# Patient Record
Sex: FEMALE | Race: WHITE | NOT HISPANIC OR LATINO | Employment: OTHER | URBAN - METROPOLITAN AREA
[De-identification: names, ages, dates, MRNs, and addresses within clinical notes are randomized per-mention and may not be internally consistent; named-entity substitution may affect disease eponyms.]

---

## 2019-01-28 ENCOUNTER — HISTORICAL (OUTPATIENT)
Dept: RADIOLOGY | Facility: HOSPITAL | Age: 69
End: 2019-01-28

## 2019-02-01 ENCOUNTER — HISTORICAL (OUTPATIENT)
Dept: RADIOLOGY | Facility: HOSPITAL | Age: 69
End: 2019-02-01

## 2019-05-23 ENCOUNTER — OFFICE VISIT (OUTPATIENT)
Dept: RHEUMATOLOGY | Facility: CLINIC | Age: 69
End: 2019-05-23
Payer: MEDICARE

## 2019-05-23 VITALS
SYSTOLIC BLOOD PRESSURE: 149 MMHG | BODY MASS INDEX: 24.96 KG/M2 | WEIGHT: 146.19 LBS | DIASTOLIC BLOOD PRESSURE: 86 MMHG | HEIGHT: 64 IN | HEART RATE: 75 BPM

## 2019-05-23 DIAGNOSIS — M79.7 FIBROMYALGIA: Primary | ICD-10-CM

## 2019-05-23 PROCEDURE — 99203 OFFICE O/P NEW LOW 30 MIN: CPT | Mod: PBBFAC,PN | Performed by: STUDENT IN AN ORGANIZED HEALTH CARE EDUCATION/TRAINING PROGRAM

## 2019-05-23 PROCEDURE — 99203 OFFICE O/P NEW LOW 30 MIN: CPT | Mod: S$PBB,ICN,CMP, | Performed by: STUDENT IN AN ORGANIZED HEALTH CARE EDUCATION/TRAINING PROGRAM

## 2019-05-23 PROCEDURE — 99999 PR PBB SHADOW E&M-NEW PATIENT-LVL III: ICD-10-PCS | Mod: PBBFAC,,, | Performed by: STUDENT IN AN ORGANIZED HEALTH CARE EDUCATION/TRAINING PROGRAM

## 2019-05-23 PROCEDURE — 99203 PR OFFICE/OUTPT VISIT, NEW, LEVL III, 30-44 MIN: ICD-10-PCS | Mod: S$PBB,ICN,CMP, | Performed by: STUDENT IN AN ORGANIZED HEALTH CARE EDUCATION/TRAINING PROGRAM

## 2019-05-23 PROCEDURE — 99999 PR PBB SHADOW E&M-NEW PATIENT-LVL III: CPT | Mod: PBBFAC,,, | Performed by: STUDENT IN AN ORGANIZED HEALTH CARE EDUCATION/TRAINING PROGRAM

## 2019-05-23 RX ORDER — OXYCODONE HYDROCHLORIDE 10 MG/1
10 TABLET ORAL EVERY 8 HOURS PRN
Refills: 0 | COMMUNITY
Start: 2019-05-16

## 2019-05-23 RX ORDER — GABAPENTIN 300 MG/1
600 CAPSULE ORAL NIGHTLY
Qty: 60 CAPSULE | Refills: 5 | Status: SHIPPED | OUTPATIENT
Start: 2019-05-23 | End: 2020-05-22

## 2019-05-23 RX ORDER — DULOXETIN HYDROCHLORIDE 60 MG/1
60 CAPSULE, DELAYED RELEASE ORAL DAILY
COMMUNITY
Start: 2019-05-22

## 2019-05-23 RX ORDER — LORAZEPAM 2 MG/1
2 TABLET ORAL 3 TIMES DAILY
Refills: 3 | COMMUNITY
Start: 2019-05-09

## 2019-05-23 RX ORDER — LISINOPRIL 20 MG/1
20 TABLET ORAL DAILY
Refills: 6 | COMMUNITY
Start: 2019-03-25

## 2019-05-23 RX ORDER — LEVOTHYROXINE SODIUM 100 UG/1
100 TABLET ORAL DAILY
COMMUNITY
Start: 2019-05-22

## 2019-05-23 RX ORDER — WARFARIN SODIUM 5 MG/1
5 TABLET ORAL DAILY
Refills: 5 | COMMUNITY
Start: 2019-04-22

## 2019-05-23 RX ORDER — TIZANIDINE 4 MG/1
4 TABLET ORAL EVERY 8 HOURS PRN
Refills: 2 | COMMUNITY
Start: 2019-05-09 | End: 2023-06-10 | Stop reason: SDUPTHER

## 2019-05-24 NOTE — PROGRESS NOTES
RHEUMATOLOGY OUTPATIENT CLINIC NOTE        Attending Rheumatologist: Magda Sinclair  Primary Care Provider: Primary Doctor No   Physician Requesting Consultation: Aaareferral Self  No address on file  Chief Complaint/Reason For Consultation:  Fibromyalgia      Subjective:       HPI  Mary Aguayo is a 69 y.o. White female presents for evaluation of fibromyalgia.  Space reports having diffuse generalized body pain and achiness.  Not sleeping well.  Weeks of feeling unrefreshed.  Reports feeling very fatigued after little activity, like if she overdoes it she  is pain for it the next day.  Is currently on Cymbalta 60 mg daily.  Denies any previous relief with amitriptyline or gabapentin.  Does not remember dosages used.  Walks about 30 min a day for exercise.  Denies any significant change in mood or stress.  No history of photosensitivity, Raynaud's, pleurisy, oral ulcers, hair loss, swollen joints, miscarriages.    14pt ros negative except as otherwise stated above      Review of Systems   Constitutional: Positive for malaise/fatigue. Negative for fever and weight loss.   HENT: Positive for hearing loss. Negative for congestion and sinus pain.    Eyes: Negative for blurred vision and photophobia.   Respiratory: Negative for cough and sputum production.    Cardiovascular: Negative for chest pain and orthopnea.   Gastrointestinal: Negative for abdominal pain, heartburn and vomiting.   Genitourinary: Negative for dysuria and frequency.   Musculoskeletal: Positive for back pain, joint pain, myalgias and neck pain.   Skin: Negative for itching and rash.   Neurological: Negative for dizziness, focal weakness and headaches.   Endo/Heme/Allergies: Negative for polydipsia. Does not bruise/bleed easily.       Chronic comorbid conditions affecting medical decision making today:  No past medical history on file.  No past surgical history on file.  No family history on file.  Social History     Substance and Sexual  Activity   Alcohol Use Not on file     Social History     Tobacco Use   Smoking Status Not on file     Social History     Substance and Sexual Activity   Drug Use Not on file       Current Outpatient Medications:     DULoxetine (CYMBALTA) 60 MG capsule, Take 60 mg by mouth once daily., Disp: , Rfl:     levothyroxine (SYNTHROID) 100 MCG tablet, Take 100 mcg by mouth once daily., Disp: , Rfl:     lisinopril (PRINIVIL,ZESTRIL) 20 MG tablet, Take 20 mg by mouth once daily., Disp: , Rfl: 6    LORazepam (ATIVAN) 2 MG Tab, Take 2 mg by mouth 3 (three) times daily., Disp: , Rfl: 3    oxyCODONE (ROXICODONE) 10 mg Tab immediate release tablet, Take 10 mg by mouth every 8 (eight) hours as needed., Disp: , Rfl: 0    tiZANidine (ZANAFLEX) 4 MG tablet, Take 4 mg by mouth every 8 (eight) hours as needed., Disp: , Rfl: 2    warfarin (COUMADIN) 5 MG tablet, Take 5 mg by mouth Daily., Disp: , Rfl: 5    gabapentin (NEURONTIN) 300 MG capsule, Take 2 capsules (600 mg total) by mouth every evening., Disp: 60 capsule, Rfl: 5       Objective:         Vitals:    05/23/19 1051   BP: (!) 149/86   Pulse: 75     Physical Exam   Physical Exam   Nursing note and vitals reviewed.  Constitutional: She is oriented to person, place, and time and well-developed, well-nourished, and in no distress. No distress.   HENT:   Head: Normocephalic and atraumatic.   Eyes: Conjunctivae and EOM are normal. Pupils are equal, round, and reactive to light.   Neck: Normal range of motion. Neck supple.   Cardiovascular: Normal rate, regular rhythm and normal heart sounds.    Pulmonary/Chest: Effort normal and breath sounds normal. No respiratory distress.   Abdominal: Soft. Bowel sounds are normal. She exhibits no distension.   Neurological: She is alert and oriented to person, place, and time.   Skin: Skin is warm and dry. No rash noted.     Psychiatric: Affect and judgment normal.   Musculoskeletal: Normal range of motion. She exhibits no edema or  deformity.   No synovitis in small joints of hands and feet. No effusions over large joints.          Reviewed old and all outside pertinent medical records available.    All lab results personally reviewed and interpreted by me.  No results found for: WBC, HGB, HCT, MCV, MCH, MCHC, RDW, PLT, MPV, NEUTROABS, LYMPHOABS, MONOABS, EOSINOABS, BASOSABS, NEUTROPCT, LYMPHOPCT, MONOPCT, EOSINOPCT, BASOPCT, DIFFTYPE, RBCMORPHOLOG, PLTEST    No results found for: NA, K, CL, CO2, GLU, BUN, LABCREA, CALCIUM, PROT, ALBUMIN, BILITOT, AST, ALKPHOS, ALT, GFRAA, GFRNONAA    No results found for: COLORU, APPEARANCEUA, SPECGRAV, PHUR, PROTEINUA, GLUCOSEU, KETONESU, BLOODU, LEUKOCYTESUR, NITRITE, UROBILINOGEN    No results found for: CRP    No results found for: SEDRATE, ERYTHROCYTES    No results found for: ASHLY, RF, SEDRATE    No components found for: 25OHVITDTOT, 49QXLLBB9, 67XVAJPT2, METHODNOTE    No results found for: URICACID    No components found for: TSPOTTB         ASSESSMENT / PLAN:     Mary Aguayo is a 69 y.o. White female with:    #Fibromyalgia  -+Generalized achiness and pain, fatigue  -Previous hx of gabapentin and amitriptyline, stopped 2.2 lack of efficacy but pt states >20 years ago, dosage unknown.   -Discussed importance of proper sleep hygiene and exercise, recommend low impact aerobics.   -Can try gabapentin 300mg qhs. Increase to 600mg qhs if tolerated  -Can continue to follow w pcp for any further medicaton changes       Follow up if symptoms worsen or fail to improve.    Method of contact with patient concerns: Nichellehart attn Rheumatology      Magda Sinclair M.D.  Rheumatology Department   [facility] - [address] [address]  Phone: [phone]  Fax: [phone]

## 2019-06-18 ENCOUNTER — TELEPHONE (OUTPATIENT)
Dept: RHEUMATOLOGY | Facility: CLINIC | Age: 69
End: 2019-06-18

## 2019-06-18 NOTE — TELEPHONE ENCOUNTER
----- Message from Willa Edwards sent at 6/18/2019 10:11 AM CDT -----  Contact: pt   Pt needs findings report sent to Dr.Stephen Pina  Office  [phone] fax: [phone] .. pts call back:[phone] (home)

## 2019-08-05 ENCOUNTER — HISTORICAL (OUTPATIENT)
Dept: RADIOLOGY | Facility: HOSPITAL | Age: 69
End: 2019-08-05

## 2021-02-04 ENCOUNTER — HISTORICAL (OUTPATIENT)
Dept: RADIOLOGY | Facility: HOSPITAL | Age: 71
End: 2021-02-04

## 2021-02-10 ENCOUNTER — HISTORICAL (OUTPATIENT)
Dept: RADIOLOGY | Facility: HOSPITAL | Age: 71
End: 2021-02-10

## 2022-08-29 ENCOUNTER — HOSPITAL ENCOUNTER (OUTPATIENT)
Dept: RADIOLOGY | Facility: HOSPITAL | Age: 72
Discharge: HOME | End: 2022-08-29
Attending: FAMILY MEDICINE
Payer: MEDICARE

## 2022-08-29 DIAGNOSIS — M79.89 OTHER SPECIFIED SOFT TISSUE DISORDERS: ICD-10-CM

## 2022-08-29 DIAGNOSIS — I82.409 DVT (DEEP VENOUS THROMBOSIS): ICD-10-CM

## 2022-08-29 PROCEDURE — 93971 EXTREMITY STUDY: CPT | Mod: TC,LT

## 2022-09-22 DIAGNOSIS — M79.89 SWELLING OF LIMB: Primary | ICD-10-CM

## 2022-09-23 ENCOUNTER — HOSPITAL ENCOUNTER (OUTPATIENT)
Dept: RADIOLOGY | Facility: HOSPITAL | Age: 72
Discharge: HOME | End: 2022-09-23
Attending: FAMILY MEDICINE
Payer: MEDICARE

## 2022-09-23 DIAGNOSIS — M79.89 SWELLING OF LIMB: ICD-10-CM

## 2022-09-23 PROCEDURE — 74176 CT ABD & PELVIS W/O CONTRAST: CPT | Mod: TC

## 2023-01-06 ENCOUNTER — HOSPITAL ENCOUNTER (OUTPATIENT)
Dept: RADIOLOGY | Facility: HOSPITAL | Age: 73
Discharge: HOME | End: 2023-01-06
Attending: FAMILY MEDICINE
Payer: MEDICARE

## 2023-01-06 DIAGNOSIS — Z78.0 POSTMENOPAUSAL STATE: ICD-10-CM

## 2023-01-06 DIAGNOSIS — Z12.31 ENCOUNTER FOR SCREENING MAMMOGRAM FOR BREAST CANCER: ICD-10-CM

## 2023-01-06 PROCEDURE — 77067 SCR MAMMO BI INCL CAD: CPT | Mod: 26,,, | Performed by: STUDENT IN AN ORGANIZED HEALTH CARE EDUCATION/TRAINING PROGRAM

## 2023-01-06 PROCEDURE — 77063 MAMMO DIGITAL SCREENING BILAT WITH TOMO: ICD-10-PCS | Mod: 26,,, | Performed by: STUDENT IN AN ORGANIZED HEALTH CARE EDUCATION/TRAINING PROGRAM

## 2023-01-06 PROCEDURE — 77067 MAMMO DIGITAL SCREENING BILAT WITH TOMO: ICD-10-PCS | Mod: 26,,, | Performed by: STUDENT IN AN ORGANIZED HEALTH CARE EDUCATION/TRAINING PROGRAM

## 2023-01-06 PROCEDURE — 77067 SCR MAMMO BI INCL CAD: CPT | Mod: TC

## 2023-01-06 PROCEDURE — 77080 DXA BONE DENSITY AXIAL: CPT | Mod: TC

## 2023-01-06 PROCEDURE — 77063 BREAST TOMOSYNTHESIS BI: CPT | Mod: 26,,, | Performed by: STUDENT IN AN ORGANIZED HEALTH CARE EDUCATION/TRAINING PROGRAM

## 2023-06-10 ENCOUNTER — HOSPITAL ENCOUNTER (EMERGENCY)
Facility: HOSPITAL | Age: 73
Discharge: HOME | End: 2023-06-10
Attending: INTERNAL MEDICINE
Payer: MEDICARE

## 2023-06-10 VITALS
WEIGHT: 199 LBS | HEART RATE: 86 BPM | TEMPERATURE: 97 F | BODY MASS INDEX: 33.97 KG/M2 | RESPIRATION RATE: 17 BRPM | OXYGEN SATURATION: 94 % | SYSTOLIC BLOOD PRESSURE: 132 MMHG | DIASTOLIC BLOOD PRESSURE: 71 MMHG | HEIGHT: 64 IN

## 2023-06-10 DIAGNOSIS — M54.31 SCIATICA OF RIGHT SIDE: Primary | ICD-10-CM

## 2023-06-10 LAB
INR BLD: 1.21 (ref 0–1.3)
PROTHROMBIN TIME: 15.6 SECONDS (ref 12.5–14.5)

## 2023-06-10 PROCEDURE — 96372 THER/PROPH/DIAG INJ SC/IM: CPT | Performed by: INTERNAL MEDICINE

## 2023-06-10 PROCEDURE — 85610 PROTHROMBIN TIME: CPT | Performed by: INTERNAL MEDICINE

## 2023-06-10 PROCEDURE — 99285 EMERGENCY DEPT VISIT HI MDM: CPT | Mod: 25

## 2023-06-10 PROCEDURE — 63600175 PHARM REV CODE 636 W HCPCS: Performed by: INTERNAL MEDICINE

## 2023-06-10 RX ORDER — DEXAMETHASONE SODIUM PHOSPHATE 4 MG/ML
8 INJECTION, SOLUTION INTRA-ARTICULAR; INTRALESIONAL; INTRAMUSCULAR; INTRAVENOUS; SOFT TISSUE
Status: COMPLETED | OUTPATIENT
Start: 2023-06-10 | End: 2023-06-10

## 2023-06-10 RX ORDER — KETOROLAC TROMETHAMINE 30 MG/ML
30 INJECTION, SOLUTION INTRAMUSCULAR; INTRAVENOUS
Status: COMPLETED | OUTPATIENT
Start: 2023-06-10 | End: 2023-06-10

## 2023-06-10 RX ORDER — TIZANIDINE 4 MG/1
4 TABLET ORAL EVERY 8 HOURS PRN
Qty: 30 TABLET | Refills: 0 | Status: SHIPPED | OUTPATIENT
Start: 2023-06-10

## 2023-06-10 RX ORDER — METHYLPREDNISOLONE 4 MG/1
TABLET ORAL
Qty: 21 EACH | Refills: 0 | Status: SHIPPED | OUTPATIENT
Start: 2023-06-10 | End: 2023-07-01

## 2023-06-10 RX ADMIN — DEXAMETHASONE SODIUM PHOSPHATE 8 MG: 4 INJECTION, SOLUTION INTRA-ARTICULAR; INTRALESIONAL; INTRAMUSCULAR; INTRAVENOUS; SOFT TISSUE at 09:06

## 2023-06-10 RX ADMIN — KETOROLAC TROMETHAMINE 30 MG: 30 INJECTION, SOLUTION INTRAMUSCULAR; INTRAVENOUS at 09:06

## 2023-06-10 NOTE — DISCHARGE INSTRUCTIONS
Talk to your doctor about adjusting the dose of your Coumadin as your INR is 1.2.    Talk to your doctor about getting possibly an MRI of the back to look at the disc more appropriately CT scan done today does not show any major abnormality        Take medicines as prescribed    See your family doctor in one to 2 days for further evaluation, workup, and treatment as necessary    Avoid driving or operating machinery while taking medicines as some medicines might cause drowsiness and may cause problems. Also pain medicines have potential of being addictive  so use Pain meds specially Narcotics Sparingly.    The exam and treatment you received in Emergency Room was for an urgent problem and NOT INTENDED AS COMPLETE CARE. It is important that you FOLLOW UP with a doctor for ongoing care. If your symptoms become WORSE or you DO NOT IMPROVE and you are unable to reach your health care provider, you should RETURN to the emergency department. The Emergency Room doctor has provided a PRELIMINARY INTERPRETATION of all your STUDIES. A final interpretation may be done after you are discharged. IF A CHANGE in your diagnosis or treatment is needed WE WILL CONTACT YOU. It is critical that we have a CURRENT PHONE NUMBER FOR YOU.

## 2023-06-10 NOTE — ED PROVIDER NOTES
06/10/2023         9:24 AM    Source of History:  History obtained from the patient.     Chief complaint:  From Nurse Triage:  Leg Pain (Pt c/o rt leg pain starting at rt groin and extending down rt leg, no swelling, denies injury. Pt reports hx of clotting disorder and is on coumadine)    HISTORY OF PRESENT ILLNES:  Mary Aguayo is a 73 y.o. female  has a past medical history of Arthritis, DVT (deep venous thrombosis), Factor V Leiden mutation, Fibromyalgia, and Hypertension. presenting with Leg Pain (Pt c/o rt leg pain starting at rt groin and extending down rt leg, no swelling, denies injury. Pt reports hx of clotting disorder and is on coumadine)      REVIEW OF SYSTEMS:   Constitutional symptoms:  No Fever. No Chills    Skin symptoms:  No Rash.    Eye symptoms:  No Visual disturbance reported.   ENMT symptoms:  No Sore throat,    Respiratory symptoms:  No Shortness of Breath, no Cough, no Wheezing.    Cardiovascular symptoms:  No Chest Pain, No Palpitations.   Gastrointestinal symptoms:  No Abdominal Pain, No Nausea, No Vomiting, No Diarrhea, No Constipation.    Genitourinary symptoms:  No Dysuria,    Musculoskeletal symptoms:   Back pain,  goes down the right leg anteriorly  Neurologic symptoms:  No Headache, No Dizziness.    Psychiatric symptoms:  No Anxiety, No Depression, No Substance Abuse.              Additional review of systems information: Patient Denies Any Other Complaints.    All Other Systems Reviewed With Patient And Negative.    ALLEGIES:  Review of patient's allergies indicates:   Allergen Reactions    Pcn [penicillins] Hives       MEDICINE LIST:  Current Outpatient Medications   Medication Instructions    DULoxetine (CYMBALTA) 60 mg, Oral, Daily    gabapentin (NEURONTIN) 600 mg, Oral, Nightly    levothyroxine (SYNTHROID) 100 mcg, Oral, Daily    lisinopriL (PRINIVIL,ZESTRIL) 20 mg, Oral, Daily    LORazepam (ATIVAN) 2 mg, Oral, 3 times daily    methylPREDNISolone (MEDROL DOSEPACK) 4 mg  tablet use as directed    oxyCODONE (ROXICODONE) 10 mg, Oral, Every 8 hours PRN    tiZANidine (ZANAFLEX) 4 mg, Oral, Every 8 hours PRN    warfarin (COUMADIN) 5 mg, Oral, Daily        PMH:  As per HPI and below:    Reviewed and updated in chart.    PAST MEDICAL HISTORY:  Past Medical History:   Diagnosis Date    Arthritis     DVT (deep venous thrombosis)     Factor V Leiden mutation     Fibromyalgia     Hypertension         PAST SURGICAL HISTORY:  Past Surgical History:   Procedure Laterality Date    BILATERAL TUBAL LIGATION Bilateral     CHOLECYSTECTOMY         SOCIAL HISTORY:  Social History     Tobacco Use    Smoking status: Never    Smokeless tobacco: Never   Substance Use Topics    Alcohol use: Never    Drug use: Never       FAMILY HISTORY:  Family History   Problem Relation Age of Onset    Lymphoma Mother     Dementia Father         PROBLEM LIST:  There is no problem list on file for this patient.       PHYSICAL EXAM:      ED Triage Vitals [06/10/23 0903]   BP (!) 148/86   Pulse 99   Resp 18   Temp 97 °F (36.1 °C)   SpO2 (!) 92 %        Vital Signs: Reviewed As In Chart.  General:  Alert, No Cardiorespiratory Distress Noted.   Eye:  Extraocular Movements Are Intact.   ENT: Mucus membranes are moist.   Cardiovascular:  Regular Rate And Rhythm, No Murmur, No Pedal Edema.  2+ bilateral posterior tibial and dorsalis pedis pulses  Respiratory:  Respirations Nonlabored, No Respiratory Distress, Good Bilateral Air Entry, No Rales, No Rhonchi.    Gastrointestinal:  Soft, Non Distended, Non Tenderness, Normal Bowel Sounds.    Neurological:  Alert And Oriented To Person, Place, Time, And Situation, Normal Motor Observed, Normal Speech Observed.  Musculoskeletal:  No Gross Deformity Noted.  No tenderness in the calf, no swelling in the lower extremity, normal capillary refill, no swelling on either extremities    Psychiatric:  Cooperative.      ED WORKUP FOR MEDICAL DECISION MAKING:    ED ORDERS:  Orders Placed This  Encounter   Procedures    CT Lumbar Spine Without Contrast    Protime-INR       ED MEDICINES:  Medications   dexAMETHasone injection 8 mg (8 mg Intramuscular Given 6/10/23 0937)   ketorolac injection 30 mg (30 mg Intramuscular Given 6/10/23 0937)                ED LABS ORDERED AND REVIEWED:  Admission on 06/10/2023   Component Date Value Ref Range Status    PT 06/10/2023 15.6 (H)  12.5 - 14.5 seconds Final    INR 06/10/2023 1.21  0.00 - 1.30 Final       RADIOLOGY STUDIES ORDERED AND REVIEWED:  Imaging Results              CT Lumbar Spine Without Contrast (Final result)  Result time 06/10/23 10:32:14      Final result by Tanner Forbes MD (06/10/23 10:32:14)                   Impression:      No evidence for fracture.      Electronically signed by: Tanner Forbes MD  Date:    06/10/2023  Time:    10:32               Narrative:    EXAMINATION:  CT LUMBAR SPINE WITHOUT CONTRAST    CLINICAL HISTORY:  Low back pain, symptoms persist with > 6wks conservative treatment;    TECHNIQUE:  Low-dose axial, sagittal and coronal reformations are obtained through the lumbar spine.  Contrast was not administered.  An automated dose exposure technique was utilized this limits radiation does the patient.    COMPARISON:  None.    FINDINGS:  Five lumbar type vertebral bodies.  The alignment curvature lumbar spine is normal.  The vertebral heights and intervertebral disc spaces maintained.  No evidence for compression deformity, fracture, or subluxation.  No evidence for central canal stenosis or neural foraminal narrowing.  Scattered spondylotic changes are identified.    The visualized hollow and solid viscera grossly normal.  The bilateral SI joints are grossly normal.                                      MEDICAL DECISION MAKING:      Reviewed Nurses Note. Reviewed Vital Signs.     Reviewed Pertinent old records, History and updated as necessary.    Vitals:    06/10/23 1104   BP: 132/71   Pulse: 86   Resp: 17   Temp:         Medical  Decision Making  73 y.o. female  has a past medical history of Arthritis, DVT (deep venous thrombosis), Factor V Leiden mutation, Fibromyalgia, and Hypertension. presenting with Leg Pain (Pt c/o rt leg pain starting at rt groin and extending down rt leg, no swelling, denies injury. Pt reports hx of clotting disorder and is on coumadine)    Patient says that she has factor 5 Leiden and she is on Coumadin taking it regularly, she is been having lower extremity pain for years 2 months now, she says she continues to have pain in the back coming down the right leg, so today her family doctor told her to go to the emergency room because she can not take it anymore.  Patient had a CT scan done for this type of pain in the past of the abdomen which did not show any major abnormality, she also had the same symptoms in the left lower extremity and DVT was negative, she is having the same pain in the right lower extremity for months now and today she decided to come to the emergency room.    Patient says that she has the fibromyalgia, and steroids do not work for her and also she was given steroids for osteoarthritis and they did not help either.              ED Course as of 06/10/23 1105   Sat Nathaniel 10, 2023   1104 Patient's CT scan does not show any major abnormality at this time, I will put her on muscle relaxant and the Medrol Dosepak after the shot she is feeling better at this time,    I have also advised her that her INR is 1.2 and she should talk to her family doctor about adjusting the dose of her Coumadin as he knows better.  Patient verbalized understanding and she knows that her Coumadin has to be adjusted.  I will discharge her home with instruction to take Medrol Dosepak and tizanidine, she is already prescribed tizanidine, I have advised her to talk to her family doctor about possibly getting an MRI of the back if she continues to have the problem. [GQ]      ED Course User Index  [GQ] Benjie Mcdaniel MD             PROCEDURES PERFORMED IN ED:  Procedures    DIAGNOSTIC IMPRESSION:        ICD-10-CM ICD-9-CM   1. Sciatica of right side  M54.31 724.3         ED Disposition Condition    Discharge Stable               Medication List        START taking these medications      methylPREDNISolone 4 mg tablet  Commonly known as: MEDROL DOSEPACK  use as directed            CONTINUE taking these medications      tiZANidine 4 MG tablet  Commonly known as: ZANAFLEX  Take 1 tablet (4 mg total) by mouth every 8 (eight) hours as needed.            ASK your doctor about these medications      DULoxetine 60 MG capsule  Commonly known as: CYMBALTA     gabapentin 300 MG capsule  Commonly known as: NEURONTIN  Take 2 capsules (600 mg total) by mouth every evening.     levothyroxine 100 MCG tablet  Commonly known as: SYNTHROID     lisinopriL 20 MG tablet  Commonly known as: PRINIVIL,ZESTRIL     LORazepam 2 MG Tab  Commonly known as: ATIVAN     oxyCODONE 10 mg Tab immediate release tablet  Commonly known as: ROXICODONE     warfarin 5 MG tablet  Commonly known as: COUMADIN               Where to Get Your Medications        These medications were sent to [facility] [place]  [address]  [address] [address]      Phone: [phone]   methylPREDNISolone 4 mg tablet  tiZANidine 4 MG tablet           Follow-up Information       Reynold Pina MD In 2 days.    Specialty: Family Medicine  Contact information:  [address]  [address]  [place] [address] [phone]                              ED Prescriptions       Medication Sig Dispense Start Date End Date Auth. Provider    methylPREDNISolone (MEDROL DOSEPACK) 4 mg tablet use as directed 21 each 6/10/2023 7/1/2023 Benjie Mcdaniel MD    tiZANidine (ZANAFLEX) 4 MG tablet Take 1 tablet (4 mg total) by mouth every 8 (eight) hours as needed. 30 tablet 6/10/2023 -- Benjie Mcdaniel MD          Follow-up Information       Follow up With Specialties Details Why  Contact Info    Reynold Pina MD Family Medicine In 2 days  [address]  [address]  [place] [address]  [phone]                 Benjie Mcdaniel MD  06/10/23 8921

## 2023-06-20 DIAGNOSIS — M54.31 RIGHT SIDED SCIATICA: Primary | ICD-10-CM

## 2023-06-23 ENCOUNTER — HOSPITAL ENCOUNTER (OUTPATIENT)
Dept: RADIOLOGY | Facility: HOSPITAL | Age: 73
Discharge: HOME | End: 2023-06-23
Attending: FAMILY MEDICINE
Payer: MEDICARE

## 2023-06-23 DIAGNOSIS — M54.31 RIGHT SIDED SCIATICA: ICD-10-CM

## 2023-06-23 PROCEDURE — 72148 MRI LUMBAR SPINE W/O DYE: CPT | Mod: TC

## 2023-07-23 ENCOUNTER — HOSPITAL ENCOUNTER (EMERGENCY)
Facility: HOSPITAL | Age: 73
Discharge: HOME | End: 2023-07-23
Attending: EMERGENCY MEDICINE
Payer: MEDICARE

## 2023-07-23 VITALS
DIASTOLIC BLOOD PRESSURE: 98 MMHG | HEART RATE: 95 BPM | SYSTOLIC BLOOD PRESSURE: 196 MMHG | BODY MASS INDEX: 30.73 KG/M2 | OXYGEN SATURATION: 92 % | TEMPERATURE: 98 F | RESPIRATION RATE: 16 BRPM | WEIGHT: 180 LBS | HEIGHT: 64 IN

## 2023-07-23 DIAGNOSIS — S00.03XA HEMATOMA OF SCALP, INITIAL ENCOUNTER: ICD-10-CM

## 2023-07-23 DIAGNOSIS — S22.080A CLOSED WEDGE COMPRESSION FRACTURE OF T12 VERTEBRA, INITIAL ENCOUNTER: Primary | ICD-10-CM

## 2023-07-23 PROCEDURE — 96372 THER/PROPH/DIAG INJ SC/IM: CPT | Performed by: NURSE PRACTITIONER

## 2023-07-23 PROCEDURE — 25000003 PHARM REV CODE 250: Performed by: NURSE PRACTITIONER

## 2023-07-23 PROCEDURE — 63600175 PHARM REV CODE 636 W HCPCS: Performed by: NURSE PRACTITIONER

## 2023-07-23 PROCEDURE — 99285 EMERGENCY DEPT VISIT HI MDM: CPT | Mod: 25

## 2023-07-23 RX ORDER — HYDROCODONE BITARTRATE AND ACETAMINOPHEN 10; 325 MG/1; MG/1
1 TABLET ORAL EVERY 6 HOURS PRN
Qty: 12 TABLET | Refills: 0 | Status: SHIPPED | OUTPATIENT
Start: 2023-07-23 | End: 2023-07-28

## 2023-07-23 RX ORDER — HYDROMORPHONE HYDROCHLORIDE 2 MG/ML
0.5 INJECTION, SOLUTION INTRAMUSCULAR; INTRAVENOUS; SUBCUTANEOUS
Status: COMPLETED | OUTPATIENT
Start: 2023-07-23 | End: 2023-07-23

## 2023-07-23 RX ORDER — HYDROCODONE BITARTRATE AND ACETAMINOPHEN 10; 325 MG/1; MG/1
1 TABLET ORAL
Status: COMPLETED | OUTPATIENT
Start: 2023-07-23 | End: 2023-07-23

## 2023-07-23 RX ADMIN — HYDROMORPHONE HYDROCHLORIDE 0.5 MG: 2 INJECTION, SOLUTION INTRAMUSCULAR; INTRAVENOUS; SUBCUTANEOUS at 05:07

## 2023-07-23 RX ADMIN — HYDROCODONE BITARTRATE AND ACETAMINOPHEN 1 TABLET: 10; 325 TABLET ORAL at 04:07

## 2023-07-23 NOTE — ED PROVIDER NOTES
Encounter Date: 7/23/2023       History     Chief Complaint   Patient presents with    Fall     Fall down 2 steps pta. -loc. -bt. Hematoma to back of head. Complains of pain to midline lower back and left elbow as well     Patient is a 73-year-old female who presents emerged department with complaints of head and lower back pain.  She states she was walking down some steps and lost her balance landing on her back in the back of her head.  There is a hematoma noted to the posterior scalp on the left side.  She denies losing consciousness.  She denies any other complaints or associated symptoms.  She does have history of back issues and recently had an MRI which showed bulging disc.    Review of patient's allergies indicates:   Allergen Reactions    Pcn [penicillins] Hives     Past Medical History:   Diagnosis Date    Arthritis     DVT (deep venous thrombosis)     Factor V Leiden mutation     Fibromyalgia     Hypertension      Past Surgical History:   Procedure Laterality Date    BILATERAL TUBAL LIGATION Bilateral     CHOLECYSTECTOMY       Family History   Problem Relation Age of Onset    Lymphoma Mother     Dementia Father      Social History     Tobacco Use    Smoking status: Never    Smokeless tobacco: Never   Substance Use Topics    Alcohol use: Never    Drug use: Never     Review of Systems   Constitutional:  Negative for activity change, appetite change and fever.   HENT:  Negative for congestion, dental problem and sore throat.    Eyes:  Negative for discharge and itching.   Respiratory:  Negative for apnea, chest tightness and shortness of breath.    Cardiovascular:  Negative for chest pain.   Gastrointestinal:  Negative for abdominal distention, abdominal pain and nausea.   Endocrine: Negative for cold intolerance and heat intolerance.   Genitourinary:  Negative for dysuria, vaginal bleeding, vaginal discharge and vaginal pain.   Musculoskeletal:  Positive for back pain, gait problem and myalgias. Negative  for neck pain and neck stiffness.   Skin:  Negative for rash.   Neurological:  Positive for headaches. Negative for dizziness, facial asymmetry and weakness.   Hematological:  Does not bruise/bleed easily.   Psychiatric/Behavioral:  Negative for agitation and behavioral problems.    All other systems reviewed and are negative.    Physical Exam     Initial Vitals [07/23/23 1422]   BP Pulse Resp Temp SpO2   (!) 164/111 95 20 97.5 °F (36.4 °C) (!) 92 %      MAP       --         Physical Exam    Nursing note and vitals reviewed.  Constitutional: Vital signs are normal. She appears well-developed and well-nourished.  Non-toxic appearance. She does not have a sickly appearance.   HENT:   Head: Normocephalic and atraumatic.   Right Ear: External ear normal.   Left Ear: External ear normal.   Eyes: Conjunctivae, EOM and lids are normal. Pupils are equal, round, and reactive to light. Lids are everted and swept, no foreign bodies found.   Neck: Trachea normal and phonation normal. Neck supple. No thyroid mass and no thyromegaly present.   Normal range of motion.   Full passive range of motion without pain.     Cardiovascular:  Normal rate, regular rhythm, S1 normal, S2 normal, normal heart sounds, intact distal pulses and normal pulses.           Pulmonary/Chest: Breath sounds normal. No respiratory distress.   Abdominal: Abdomen is soft. There is no abdominal tenderness.   Musculoskeletal:         General: Tenderness present. No edema.      Cervical back: Full passive range of motion without pain, normal range of motion and neck supple.      Comments: Large hematoma to the left posterior scalp is tender in moderately inflamed.  No active bleeding or laceration noted.    Patient is also tender to midline spine around L5-L6 region.  No bruising swelling deformity noted on exam of the back but she is tender with palpation.     Lymphadenopathy:     She has no cervical adenopathy.   Neurological: She is alert and oriented to  person, place, and time. She has normal strength. GCS score is 15. GCS eye subscore is 4. GCS verbal subscore is 5. GCS motor subscore is 6.   Skin: Skin is warm, dry and intact. Capillary refill takes less than 2 seconds.   Psychiatric: She has a normal mood and affect. Her speech is normal and behavior is normal. Judgment normal. Cognition and memory are normal.       ED Course   Procedures  Labs Reviewed - No data to display       Imaging Results              CT Lumbar Spine Without Contrast (Final result)  Result time 07/23/23 16:18:26      Final result by Tanner Cruz MD (07/23/23 16:18:26)                   Impression:      Nondisplaced fracture of the superior endplate of the T12 vertebral body without involvement of the posterior aspect of the vertebral body or retropulsion of fracture fragments.      Electronically signed by: Tanner Cruz MD  Date:    07/23/2023  Time:    16:18               Narrative:    EXAMINATION:  CT LUMBAR SPINE WITHOUT CONTRAST    CLINICAL HISTORY:  Low back pain, trauma;    TECHNIQUE:  Axial images of the lumbar spine were obtained without IV contrast administration.  Coronal and sagittal reconstructions were provided.  Three dimensional and MIP images were obtained and evaluated.  Total DLP was 1051 mGy-cm. Dose lowering technique and automated exposure control were utilized for this exam.    COMPARISON:  MRI of the lumbar spine 06/23/2023.    FINDINGS:  There is a nondisplaced fracture of the T12 vertebral body not visualized on the prior exam.  Sagittal alignment is maintained.  There is no involvement of the posterior aspect of the vertebral body.  There is no spondylolisthesis.  There is vacuum disc phenomena at L3-L4 and L4-L5.  There is multilevel degenerative facet arthropathy.  There is no bony mass lesion.    The paraspinal musculature is normal.  The aorta is nonaneurysmal.                                       CT Head Without Contrast (Final result)  Result time 07/23/23  15:15:23      Final result by Tanner Cruz MD (07/23/23 15:15:23)                   Impression:      Left parietal scalp hematoma without underlying fracture or acute intracranial abnormality.      Electronically signed by: Tanner Cruz MD  Date:    07/23/2023  Time:    15:15               Narrative:    EXAMINATION:  CT HEAD WITHOUT CONTRAST    CLINICAL HISTORY:  Head trauma, minor (Age >= 65y);    TECHNIQUE:  Axial images of the head were obtained without IV contrast administration.  Coronal and sagittal reconstructions were provided.  Three dimensional and MIP images were obtained and evaluated.  Total DLP was 944 mGy-cm. Dose lowering technique and automated exposure control were utilized for this exam.    COMPARISON:  CT of the head 10/02/2016.    FINDINGS:  There is normal brain formation.  There is normal gray-white matter differentiation.  There is no hemorrhage, hydrocephalus, or midline shift.  There is no cytotoxic or vasogenic edema.  There is no intra or extra-axial fluid collection.  There is no herniation.    There is a left posterior parietal scalp hematoma.  The underlying calvarium is intact.  The bilateral orbits are normal.  There is mucoperiosteal thickening of the left maxillary sinus.                                       Medications   HYDROcodone-acetaminophen  mg per tablet 1 tablet (1 tablet Oral Given 7/23/23 1646)   HYDROmorphone (PF) injection 0.5 mg (0.5 mg Intramuscular Given 7/23/23 1712)                 ED Course as of 07/24/23 1610   Sun Jul 23, 2023   1648  orthotics contacted for TLSO brace. [SL]      ED Course User Index  [SL] ADDISON Nickerson          Medical Decision Making  Patient is a 73-year-old female who brought into the emergency room for complaints of head and back pain.  States this started after trip fall down some stairs.  She denies loss of consciousness.  She does take blood thinners daily.    Problems Addressed:  Closed wedge compression fracture of  T12 vertebra, initial encounter: acute illness or injury     Details: CT was done back which does reveal a fracture to T12.   consult for TLSO brace.  IM and p.o. medication given here which did relieve her pain.  Discussed follow-up with neurosurgery which will be sent today for her.  Discussed oral medicine to continue at home.  Discussed brace care.  Discussed strict ED return precautions for any changing worsening symptoms which she did verbalized understanding of.  Hematoma of scalp, initial encounter: acute illness or injury     Details: Discussed ice this area topically 3 times daily for swelling as needed.    Amount and/or Complexity of Data Reviewed  External Data Reviewed: labs, radiology and notes.  Radiology: ordered. Decision-making details documented in ED Course.            Clinical Impression:   Final diagnoses:  [S22.080A] Closed wedge compression fracture of T12 vertebra, initial encounter (Primary)  [S00.03XA] Hematoma of scalp, initial encounter        ED Disposition Condition    Discharge Stable          ED Prescriptions       Medication Sig Dispense Start Date End Date Auth. Provider    HYDROcodone-acetaminophen (NORCO)  mg per tablet Take 1 tablet by mouth every 6 (six) hours as needed for Pain. 12 tablet 7/23/2023 7/28/2023 ADDISON Nickerson          Follow-up Information       Follow up With Specialties Details Why Contact Info    Reynold Pina MD Family Medicine Schedule an appointment as soon as possible for a visit  As needed, For ER Follow Up. [address]  [address]  [place] [address]  [phone]               ADDISON Nickerson  07/24/23 4471

## 2023-12-24 ENCOUNTER — HOSPITAL ENCOUNTER (EMERGENCY)
Facility: HOSPITAL | Age: 73
Discharge: HOME | End: 2023-12-25
Attending: EMERGENCY MEDICINE
Payer: MEDICARE

## 2023-12-24 DIAGNOSIS — K06.8 GINGIVAL BLEEDING: Primary | ICD-10-CM

## 2023-12-24 LAB
INR PPP: 2.2
PROTHROMBIN TIME: 25.1 SECONDS (ref 12.5–14.5)

## 2023-12-24 PROCEDURE — 25000003 PHARM REV CODE 250: Performed by: EMERGENCY MEDICINE

## 2023-12-24 PROCEDURE — 85610 PROTHROMBIN TIME: CPT | Performed by: EMERGENCY MEDICINE

## 2023-12-24 PROCEDURE — 99283 EMERGENCY DEPT VISIT LOW MDM: CPT

## 2023-12-24 RX ORDER — SILVER NITRATE 38.21; 12.74 MG/1; MG/1
1 STICK TOPICAL
Status: COMPLETED | OUTPATIENT
Start: 2023-12-24 | End: 2023-12-24

## 2023-12-24 RX ADMIN — SILVER NITRATE APPLICATORS 3 APPLICATOR: 25; 75 STICK TOPICAL at 11:12

## 2023-12-24 RX ADMIN — TRANEXAMIC ACID 1000 MG: 100 INJECTION INTRAVENOUS at 10:12

## 2023-12-25 VITALS
RESPIRATION RATE: 18 BRPM | SYSTOLIC BLOOD PRESSURE: 152 MMHG | TEMPERATURE: 98 F | WEIGHT: 209.69 LBS | HEART RATE: 89 BPM | OXYGEN SATURATION: 90 % | DIASTOLIC BLOOD PRESSURE: 89 MMHG | HEIGHT: 65 IN | BODY MASS INDEX: 34.93 KG/M2

## 2023-12-25 RX ORDER — CLINDAMYCIN HYDROCHLORIDE 300 MG/1
300 CAPSULE ORAL 3 TIMES DAILY
Qty: 21 CAPSULE | Refills: 0 | Status: SHIPPED | OUTPATIENT
Start: 2023-12-25 | End: 2024-01-01

## 2023-12-25 RX ORDER — LIDOCAINE HYDROCHLORIDE AND EPINEPHRINE 10; 10 MG/ML; UG/ML
1 INJECTION, SOLUTION INFILTRATION; PERINEURAL ONCE
Status: DISCONTINUED | OUTPATIENT
Start: 2023-12-25 | End: 2023-12-25 | Stop reason: HOSPADM

## 2023-12-25 NOTE — ED PROVIDER NOTES
ED PROVIDER NOTE  12/24/2023    CHIEF COMPLAINT:   Chief Complaint   Patient presents with    bleeding from mouth     Pt states she was eating a ham and cheese sandwich and about an hour after that she noticed bleeding from her gums on the back left side.       HISTORY OF PRESENT ILLNESS:   Mary Aguayo is a 73 y.o. female who presents with chief complaint Gingival bleeding. Onset was tonight when she was eating a sandwich and began having bleeding from her gums. Bleeding has been constant, nothing seems to make it better or worse. Denies having any pain associated with it. She is on warfarin for Factor V Leiden mutation.    The history is provided by the patient.         REVIEW OF SYSTEMS: as noted in the HPI.  NURSING NOTES REVIEWED      PAST MEDICAL/SURGICAL HISTORY:   Past Medical History:   Diagnosis Date    Arthritis     DVT (deep venous thrombosis)     Factor V Leiden mutation     Fibromyalgia     Hypertension       Past Surgical History:   Procedure Laterality Date    BILATERAL TUBAL LIGATION Bilateral     CHOLECYSTECTOMY         FAMILY HISTORY:   Family History   Problem Relation Age of Onset    Lymphoma Mother     Dementia Father        SOCIAL HISTORY:   Social History     Tobacco Use    Smoking status: Never    Smokeless tobacco: Never   Substance Use Topics    Alcohol use: Never    Drug use: Never       ALLERGIES:   Review of patient's allergies indicates:   Allergen Reactions    Pcn [penicillins] Hives       PHYSICAL EXAM:  Initial Vitals [12/24/23 2058]   BP Pulse Resp Temp SpO2   (!) 202/122 96 20 98.1 °F (36.7 °C) 97 %      MAP       --         Physical Exam    Nursing note and vitals reviewed.  Constitutional: She appears well-developed and well-nourished.   HENT:   Head: Normocephalic and atraumatic.   Mouth/Throat: Uvula is midline and mucous membranes are normal. Abnormal dentition.       Continuous oozing of blood from extensively decayed left maxillary molar which is very loose.    Eyes: EOM are normal. Pupils are equal, round, and reactive to light.   Neck: Trachea normal. Neck supple.   Cardiovascular:  Normal rate, regular rhythm and normal pulses.           Pulmonary/Chest: Effort normal and breath sounds normal.   Abdominal: Abdomen is soft. Bowel sounds are normal. There is no rebound and no guarding.   Musculoskeletal:         General: Normal range of motion.      Cervical back: Neck supple.     Neurological: She is alert and oriented to person, place, and time. GCS eye subscore is 4. GCS verbal subscore is 5. GCS motor subscore is 6.   Skin: Skin is warm and dry.   Psychiatric: She has a normal mood and affect. Her speech is normal. Thought content normal.         RESULTS:  Labs Reviewed   PROTIME-INR - Abnormal; Notable for the following components:       Result Value    PT 25.1 (*)     INR 2.2 (*)     All other components within normal limits     Imaging Results    None         PROCEDURES:  Procedures    ECG:       ED COURSE AND MEDICAL DECISION MAKING:  Medications   LIDOcaine-EPINEPHrine 1%-1:100,000 injection 1 mL (1 mL Other Not Given 12/25/23 0130)   tranexamic acid (CYKLOKAPRON) 3,000 mg in sodium chloride 0.9% SolP 100 mL (1,000 mg Irrigation Given 12/24/23 2230)   silver nitrate applicators applicator 1 applicator (3 applicators Topical (Top) Given 12/24/23 2330)     ED Course as of 12/25/23 0155   Sun Dec 24, 2023   2214 INR(!): 2.2 [IB]   2229 Bleeding has significantly improved, nearly completely stopped and started to form clot. Will apply TXA soaked gauze and reassess. [IB]   2314 Still having oozing of blood from gingiva around maxillary tooth. Will try silver nitrate cauterization. [IB]   Mon Dec 25, 2023   0023 Still having oozing of blood around this extensively decayed tooth which is loosely in the socket. I feel that the problem is not being able to get to the source of the bleeding which is the root of her tooth. I will attempt to inject so lidocaine with  epinephrine into the area to see if this works. [IB]   0052 Upon re-exam the bleeding has stopped with biting down on the gauze. She did not require any lido-epi injection. [IB]      ED Course User Index  [IB] Marcus Smith, DO        Medical Decision Making  73-year-old female who presents with bleeding from severely decayed loose maxillary molar.  She was on warfarin for factor 5 Leiden mutation.  INR 2.2.  Attempted to get hemostasis with TXA soaked gauze, however I later realize that she was actually bleeding around the tooth which is loosely in the socket and there was blood that would collect around the root of the tooth and whenever I would press on the tooth itself a gush of blood would come out, so the TXA was not able to reach the needed area.  However by biting down on gauze after the 3rd attempt to get this to control bleeding it does seem to have had an effect.  I discussed precautions such as spitting or drinking from a straw and that she does not need to 2 on the affected side because this would likely cause the bleeding to recur.  I would strongly consider holding her Coumadin dose tomorrow and recommend that she follow up with dentist/oral surgeon for definitive treatment of this affected tooth. She does have some mild gingival swelling associated with this tooth but does not reports any significant dental pain but does report some tenderness with manipulation of the tooth. Will empirically start on clindamycin in case infection is present and contributing to the inflammation and bleeding.  Given strict ED return precautions. I have spoken with the patient and/or caregivers. I have explained the patient's condition, diagnoses and treatment plan based on the information available to me at this time. I have answered the patient's and/or caregiver's questions and addressed any concerns. The patient and/or caregivers have as good an understanding of the patient's diagnosis, condition and treatment plan  as can be expected at this point. The vital signs have been stable. The patient's condition is stable and appropriate for discharge from the emergency department.     The patient will pursue further outpatient evaluation with the primary care physician or other designated or consulting physician as outlined in the discharge instructions. The patient and/or caregivers are agreeable to this plan of care and follow-up instructions have been explained in detail. The patient and/or caregivers have received these instructions in written format and have expressed an understanding of the discharge instructions. The patient and/or caregivers are aware that any significant change in condition or worsening of symptoms should prompt an immediate return to this or the closest emergency department or a call to 911.    Amount and/or Complexity of Data Reviewed  Labs: ordered. Decision-making details documented in ED Course.    Risk  Prescription drug management.        CLINICAL IMPRESSION:  1. Gingival bleeding        DISPOSITION:   ED Disposition Condition    Discharge Stable            ED Prescriptions       Medication Sig Dispense Start Date End Date Auth. Provider    clindamycin (CLEOCIN) 300 MG capsule Take 1 capsule (300 mg total) by mouth 3 (three) times daily. for 7 days 21 capsule 12/25/2023 1/1/2024 Marcus Smith,           Follow-up Information       Follow up With Specialties Details Why Contact Info    Reynold Pina MD Family Medicine Schedule an appointment as soon as possible for a visit   [address]  [facility] [address]  [phone]      [facility] - Emergency Dept Emergency Medicine  If symptoms worsen [address]  [facility] [address]  [phone]               Marcus Smith DO  12/25/23 0155

## 2024-09-16 ENCOUNTER — HOSPITAL ENCOUNTER (EMERGENCY)
Facility: HOSPITAL | Age: 74
Discharge: HOME | End: 2024-09-16
Attending: INTERNAL MEDICINE
Payer: MEDICARE

## 2024-09-16 VITALS
OXYGEN SATURATION: 96 % | RESPIRATION RATE: 16 BRPM | BODY MASS INDEX: 32.44 KG/M2 | TEMPERATURE: 98 F | SYSTOLIC BLOOD PRESSURE: 188 MMHG | HEART RATE: 88 BPM | WEIGHT: 190 LBS | DIASTOLIC BLOOD PRESSURE: 97 MMHG | HEIGHT: 64 IN

## 2024-09-16 DIAGNOSIS — R52 PAIN: ICD-10-CM

## 2024-09-16 DIAGNOSIS — S50.12XA CONTUSION OF LEFT FOREARM, INITIAL ENCOUNTER: Primary | ICD-10-CM

## 2024-09-16 PROCEDURE — 25000003 PHARM REV CODE 250: Performed by: PHYSICIAN ASSISTANT

## 2024-09-16 PROCEDURE — 99283 EMERGENCY DEPT VISIT LOW MDM: CPT | Mod: 25

## 2024-09-16 RX ORDER — CLONIDINE HYDROCHLORIDE 0.2 MG/1
0.2 TABLET ORAL
Status: COMPLETED | OUTPATIENT
Start: 2024-09-16 | End: 2024-09-16

## 2024-09-16 RX ORDER — HYDROCODONE BITARTRATE AND ACETAMINOPHEN 5; 325 MG/1; MG/1
1 TABLET ORAL EVERY 8 HOURS PRN
Qty: 15 TABLET | Refills: 0 | Status: SHIPPED | OUTPATIENT
Start: 2024-09-16 | End: 2024-09-21

## 2024-09-16 RX ORDER — HYDROCODONE BITARTRATE AND ACETAMINOPHEN 5; 325 MG/1; MG/1
1 TABLET ORAL
Status: COMPLETED | OUTPATIENT
Start: 2024-09-16 | End: 2024-09-16

## 2024-09-16 RX ADMIN — HYDROCODONE BITARTRATE AND ACETAMINOPHEN 1 TABLET: 5; 325 TABLET ORAL at 07:09

## 2024-09-16 RX ADMIN — CLONIDINE HYDROCHLORIDE 0.2 MG: 0.2 TABLET ORAL at 07:09

## 2024-09-17 NOTE — ED PROVIDER NOTES
Encounter Date: 9/16/2024       History     Chief Complaint   Patient presents with    Arm Injury     States he hit her left wrist on something a few days ago and then hit it again today. Having left wrist pain with swelling and large hematoma noted     74-year-old female presents to ED for evaluation of left forearm/wrist pain after hitting her arm on something several days ago.  States she then hit her arm against today causing a large hematoma.  Patient is currently on Coumadin for factor 5.    The history is provided by the patient. No  was used.     Review of patient's allergies indicates:   Allergen Reactions    Pcn [penicillins] Hives    Aspirin     Influenza virus vaccines      Past Medical History:   Diagnosis Date    Arthritis     DVT (deep venous thrombosis)     Factor V Leiden mutation     Fibromyalgia     Hypertension      Past Surgical History:   Procedure Laterality Date    BILATERAL TUBAL LIGATION Bilateral     CHOLECYSTECTOMY       Family History   Problem Relation Name Age of Onset    Lymphoma Mother      Dementia Father       Social History     Tobacco Use    Smoking status: Never    Smokeless tobacco: Never   Substance Use Topics    Alcohol use: Never    Drug use: Never     Review of Systems   Constitutional:  Negative for chills, fatigue and fever.   Respiratory:  Negative for shortness of breath.    Cardiovascular:  Negative for chest pain.   Gastrointestinal:  Negative for abdominal pain, diarrhea, nausea and vomiting.   Genitourinary:  Negative for dysuria, flank pain, frequency and urgency.   Musculoskeletal:  Positive for arthralgias and myalgias. Negative for back pain.   Skin:  Positive for wound.   All other systems reviewed and are negative.      Physical Exam     Initial Vitals [09/16/24 1547]   BP Pulse Resp Temp SpO2   (!) 218/100 98 18 98 °F (36.7 °C) 95 %      MAP       --         Physical Exam    Nursing note and vitals reviewed.  Constitutional: She appears  well-developed and well-nourished.   HENT:   Head: Normocephalic and atraumatic.   Right Ear: Tympanic membrane and external ear normal.   Left Ear: Tympanic membrane and external ear normal.   Mouth/Throat: Uvula is midline, oropharynx is clear and moist and mucous membranes are normal. No trismus in the jaw. No uvula swelling. No oropharyngeal exudate, posterior oropharyngeal edema or posterior oropharyngeal erythema.   Eyes: Conjunctivae are normal. Pupils are equal, round, and reactive to light.   Neck: Neck supple.   Normal range of motion.  Cardiovascular:  Normal rate, regular rhythm and normal heart sounds.           Pulmonary/Chest: Breath sounds normal. She has no wheezes. She has no rhonchi. She has no rales.   Abdominal: Abdomen is soft. Bowel sounds are normal. There is no abdominal tenderness.   Musculoskeletal:         General: Normal range of motion.      Cervical back: Normal range of motion and neck supple.      Comments: Left forearm with hematoma noted.  Bruising noted.  Radial pulses 2+.  Full range of motion.     Neurological: She is alert and oriented to person, place, and time.   Skin: Skin is warm and dry.   Psychiatric: She has a normal mood and affect.         ED Course   Procedures  Labs Reviewed - No data to display       Imaging Results              X-Ray Wrist Complete Left (Final result)  Result time 09/16/24 16:27:37      Final result by Marcus Geronimo MD (09/16/24 16:27:37)                   Impression:      1. No acute osseous defect identified  2. Mild osteoarthritis  3. Mild osteopenia      Electronically signed by: Marcus Geronimo  Date:    09/16/2024  Time:    16:27               Narrative:    EXAMINATION:  XR WRIST COMPLETE 3 VIEWS LEFT    CLINICAL HISTORY:  Pain, unspecified; .    COMPARISON:  None available.    FINDINGS:  AP, lateral, and oblique views reveal no definite fracture or dislocation.  Joint spaces appear grossly intact.Bony structures are osteopenic.  The  scapholunate joint is less than optimally visualized.  Mild arthritic change at the 1st metacarpal-carpal joint.                                       Medications   HYDROcodone-acetaminophen 5-325 mg per tablet 1 tablet (1 tablet Oral Given 9/16/24 1939)   cloNIDine tablet 0.2 mg (0.2 mg Oral Given 9/16/24 1948)     Medical Decision Making  74-year-old female presents to ED for evaluation of left forearm/wrist pain after hitting her arm on something several days ago.  States she then hit her arm against today causing a large hematoma.  Patient is currently on Coumadin for factor 5.    Differential diagnosis includes but isn't limited to contusion, fracture, hematoma    Amount and/or Complexity of Data Reviewed  Discussion of management or test interpretation with external provider(s): Patient presents to ED for evaluation of pain and swelling noted to her left forearm.  Contusion noted.  Patient currently on Coumadin for her factor 5 deficiency.  X-ray obtained showing no acute fracture.  Will give short course of pain medicine and Ace wrap.  Patient's blood pressure elevated here today.  Patient reports that she was compliant with her lisinopril.  Given clonidine here with improvement blood pressure.  Patient is asymptomatic.  Denies any headache, blurry vision, chest pain, dizziness, shortness of breath.  Discussed return ED precautions.  Patient verbalizes understanding.    Risk  OTC drugs.  Prescription drug management.                                      Clinical Impression:  Final diagnoses:  [R52] Pain  [S50.12XA] Contusion of left forearm, initial encounter (Primary)          ED Disposition Condition    Discharge Stable          ED Prescriptions       Medication Sig Dispense Start Date End Date Auth. Provider    HYDROcodone-acetaminophen (NORCO) 5-325 mg per tablet Take 1 tablet by mouth every 8 (eight) hours as needed for Pain. 15 tablet 9/16/2024 9/21/2024 Kristina Sheehan PA          Follow-up Information        Follow up With Specialties Details Why Contact Info    Reynold Pina MD Family Medicine   [address]  [address]  [place] [address]  [phone]               Kristina Sheehan PA  09/16/24 2033

## 2024-09-17 NOTE — DISCHARGE INSTRUCTIONS
Use ice and heat therapy, 20 minutes on minutes off.  Use Ace wrap for support    You have been prescribed Norco (Hydrocodone) for pain. Please do not take this medication while working, drinking alcohol, swimming, or while driving/operating heavy machinery. This medication may cause drowsiness, dizziness, impair judgment, and reduce physical capabilities.You should not drive, operate heavy machinery, or make life changing decisions while taking this medication.      This medication contains Tylenol. Please do not take any additional Tylenol while you are taking this medication.     While in the Emergency Department you received medication that may cause drowsiness, dizziness, impaired judgment, and reduced physical capabilities. You should not drive, operate heavy machinery, swim, or make life  changing decisions within 24 hours of receiving this medication.

## 2024-10-15 ENCOUNTER — PATIENT MESSAGE (OUTPATIENT)
Dept: RESEARCH | Facility: HOSPITAL | Age: 74
End: 2024-10-15
Payer: MEDICARE

## 2024-12-23 ENCOUNTER — HOSPITAL ENCOUNTER (INPATIENT)
Facility: HOSPITAL | Age: 74
LOS: 7 days | Discharge: HOME | DRG: 193 | End: 2024-12-30
Attending: FAMILY MEDICINE | Admitting: FAMILY MEDICINE
Payer: MEDICARE

## 2024-12-23 DIAGNOSIS — R09.02 HYPOXIA: ICD-10-CM

## 2024-12-23 DIAGNOSIS — J10.1 INFLUENZA A: Primary | ICD-10-CM

## 2024-12-23 DIAGNOSIS — R07.9 CHEST PAIN: ICD-10-CM

## 2024-12-23 PROBLEM — I10 HYPERTENSION: Status: ACTIVE | Noted: 2024-12-23

## 2024-12-23 PROBLEM — F41.1 GENERALIZED ANXIETY DISORDER: Status: ACTIVE | Noted: 2024-12-23

## 2024-12-23 PROBLEM — C73 PAPILLARY CARCINOMA, FOLLICULAR VARIANT: Status: ACTIVE | Noted: 2024-12-23

## 2024-12-23 PROBLEM — C73 PAPILLARY CARCINOMA, FOLLICULAR VARIANT: Status: RESOLVED | Noted: 2024-12-23 | Resolved: 2024-12-23

## 2024-12-23 PROBLEM — E66.9 OBESITY: Status: ACTIVE | Noted: 2024-12-23

## 2024-12-23 PROBLEM — J45.41 MODERATE PERSISTENT ASTHMA WITH ACUTE EXACERBATION: Status: ACTIVE | Noted: 2024-12-23

## 2024-12-23 LAB
ALBUMIN SERPL-MCNC: 3.7 G/DL (ref 3.4–4.8)
ALBUMIN/GLOB SERPL: 0.8 RATIO (ref 1.1–2)
ALP SERPL-CCNC: 93 UNIT/L (ref 40–150)
ALT SERPL-CCNC: 19 UNIT/L (ref 0–55)
ANION GAP SERPL CALC-SCNC: 13 MEQ/L
AST SERPL-CCNC: 21 UNIT/L (ref 5–34)
BASOPHILS # BLD AUTO: 0.06 X10(3)/MCL
BASOPHILS NFR BLD AUTO: 0.5 %
BILIRUB SERPL-MCNC: 0.3 MG/DL
BNP BLD-MCNC: 21 PG/ML
BUN SERPL-MCNC: 28 MG/DL (ref 9.8–20.1)
CALCIUM SERPL-MCNC: 9.7 MG/DL (ref 8.4–10.2)
CHLORIDE SERPL-SCNC: 101 MMOL/L (ref 98–107)
CO2 SERPL-SCNC: 26 MMOL/L (ref 23–31)
CREAT SERPL-MCNC: 1.1 MG/DL (ref 0.55–1.02)
CREAT/UREA NIT SERPL: 25
EOSINOPHIL # BLD AUTO: 0.01 X10(3)/MCL (ref 0–0.9)
EOSINOPHIL NFR BLD AUTO: 0.1 %
ERYTHROCYTE [DISTWIDTH] IN BLOOD BY AUTOMATED COUNT: 15.7 % (ref 11.5–17)
GFR SERPLBLD CREATININE-BSD FMLA CKD-EPI: 53 ML/MIN/1.73/M2
GLOBULIN SER-MCNC: 4.9 GM/DL (ref 2.4–3.5)
GLUCOSE SERPL-MCNC: 135 MG/DL (ref 82–115)
HCT VFR BLD AUTO: 43.7 % (ref 37–47)
HGB BLD-MCNC: 14.3 G/DL (ref 12–16)
IMM GRANULOCYTES # BLD AUTO: 0.11 X10(3)/MCL (ref 0–0.04)
IMM GRANULOCYTES NFR BLD AUTO: 0.8 %
LACTATE SERPL-SCNC: 1.3 MMOL/L (ref 0.5–2.2)
LYMPHOCYTES # BLD AUTO: 1.98 X10(3)/MCL (ref 0.6–4.6)
LYMPHOCYTES NFR BLD AUTO: 15 %
MCH RBC QN AUTO: 30.6 PG (ref 27–31)
MCHC RBC AUTO-ENTMCNC: 32.7 G/DL (ref 33–36)
MCV RBC AUTO: 93.6 FL (ref 80–94)
MONOCYTES # BLD AUTO: 0.81 X10(3)/MCL (ref 0.1–1.3)
MONOCYTES NFR BLD AUTO: 6.1 %
NEUTROPHILS # BLD AUTO: 10.23 X10(3)/MCL (ref 2.1–9.2)
NEUTROPHILS NFR BLD AUTO: 77.5 %
PLATELET # BLD AUTO: 298 X10(3)/MCL (ref 130–400)
PMV BLD AUTO: 9.9 FL (ref 7.4–10.4)
POTASSIUM SERPL-SCNC: 3.1 MMOL/L (ref 3.5–5.1)
PROT SERPL-MCNC: 8.6 GM/DL (ref 5.8–7.6)
RBC # BLD AUTO: 4.67 X10(6)/MCL (ref 4.2–5.4)
SODIUM SERPL-SCNC: 140 MMOL/L (ref 136–145)
TSH SERPL-ACNC: 0.74 UIU/ML (ref 0.35–4.94)
WBC # BLD AUTO: 13.2 X10(3)/MCL (ref 4.5–11.5)

## 2024-12-23 PROCEDURE — 25000242 PHARM REV CODE 250 ALT 637 W/ HCPCS: Performed by: FAMILY MEDICINE

## 2024-12-23 PROCEDURE — 94799 UNLISTED PULMONARY SVC/PX: CPT

## 2024-12-23 PROCEDURE — 25000003 PHARM REV CODE 250: Performed by: FAMILY MEDICINE

## 2024-12-23 PROCEDURE — 87040 BLOOD CULTURE FOR BACTERIA: CPT | Performed by: FAMILY MEDICINE

## 2024-12-23 PROCEDURE — 99900035 HC TECH TIME PER 15 MIN (STAT)

## 2024-12-23 PROCEDURE — 85025 COMPLETE CBC W/AUTO DIFF WBC: CPT | Performed by: FAMILY MEDICINE

## 2024-12-23 PROCEDURE — 84443 ASSAY THYROID STIM HORMONE: CPT | Performed by: FAMILY MEDICINE

## 2024-12-23 PROCEDURE — 83605 ASSAY OF LACTIC ACID: CPT | Performed by: FAMILY MEDICINE

## 2024-12-23 PROCEDURE — 36415 COLL VENOUS BLD VENIPUNCTURE: CPT | Performed by: FAMILY MEDICINE

## 2024-12-23 PROCEDURE — 83880 ASSAY OF NATRIURETIC PEPTIDE: CPT | Performed by: FAMILY MEDICINE

## 2024-12-23 PROCEDURE — 11000001 HC ACUTE MED/SURG PRIVATE ROOM

## 2024-12-23 PROCEDURE — 94761 N-INVAS EAR/PLS OXIMETRY MLT: CPT

## 2024-12-23 PROCEDURE — 27000221 HC OXYGEN, UP TO 24 HOURS

## 2024-12-23 PROCEDURE — 80053 COMPREHEN METABOLIC PANEL: CPT | Performed by: FAMILY MEDICINE

## 2024-12-23 PROCEDURE — 87185 SC STD ENZYME DETCJ PER NZM: CPT | Performed by: FAMILY MEDICINE

## 2024-12-23 PROCEDURE — 94640 AIRWAY INHALATION TREATMENT: CPT

## 2024-12-23 PROCEDURE — 63600175 PHARM REV CODE 636 W HCPCS: Performed by: FAMILY MEDICINE

## 2024-12-23 RX ORDER — ACETAMINOPHEN 325 MG/1
650 TABLET ORAL EVERY 4 HOURS PRN
Status: DISCONTINUED | OUTPATIENT
Start: 2024-12-23 | End: 2024-12-30 | Stop reason: HOSPADM

## 2024-12-23 RX ORDER — LORAZEPAM 1 MG/1
2 TABLET ORAL 3 TIMES DAILY
Status: DISCONTINUED | OUTPATIENT
Start: 2024-12-23 | End: 2024-12-30 | Stop reason: HOSPADM

## 2024-12-23 RX ORDER — DULOXETIN HYDROCHLORIDE 60 MG/1
60 CAPSULE, DELAYED RELEASE ORAL DAILY
Status: DISCONTINUED | OUTPATIENT
Start: 2024-12-24 | End: 2024-12-23

## 2024-12-23 RX ORDER — SERTRALINE HYDROCHLORIDE 50 MG/1
100 TABLET, FILM COATED ORAL DAILY
Status: DISCONTINUED | OUTPATIENT
Start: 2024-12-24 | End: 2024-12-30 | Stop reason: HOSPADM

## 2024-12-23 RX ORDER — CODEINE PHOSPHATE AND GUAIFENESIN 10; 100 MG/5ML; MG/5ML
5 SOLUTION ORAL EVERY 4 HOURS PRN
Status: DISCONTINUED | OUTPATIENT
Start: 2024-12-23 | End: 2024-12-30 | Stop reason: HOSPADM

## 2024-12-23 RX ORDER — IPRATROPIUM BROMIDE AND ALBUTEROL SULFATE 2.5; .5 MG/3ML; MG/3ML
3 SOLUTION RESPIRATORY (INHALATION) 4 TIMES DAILY
Status: DISCONTINUED | OUTPATIENT
Start: 2024-12-24 | End: 2024-12-30 | Stop reason: HOSPADM

## 2024-12-23 RX ORDER — NALOXONE HCL 0.4 MG/ML
0.02 VIAL (ML) INJECTION
Status: DISCONTINUED | OUTPATIENT
Start: 2024-12-23 | End: 2024-12-30 | Stop reason: HOSPADM

## 2024-12-23 RX ORDER — IPRATROPIUM BROMIDE AND ALBUTEROL SULFATE 2.5; .5 MG/3ML; MG/3ML
3 SOLUTION RESPIRATORY (INHALATION) EVERY 6 HOURS PRN
Status: DISCONTINUED | OUTPATIENT
Start: 2024-12-23 | End: 2024-12-30 | Stop reason: HOSPADM

## 2024-12-23 RX ORDER — ACETAMINOPHEN 325 MG/1
650 TABLET ORAL EVERY 8 HOURS PRN
Status: DISCONTINUED | OUTPATIENT
Start: 2024-12-23 | End: 2024-12-30 | Stop reason: HOSPADM

## 2024-12-23 RX ORDER — ACETAMINOPHEN 500 MG
1000 TABLET ORAL EVERY 8 HOURS PRN
Status: DISCONTINUED | OUTPATIENT
Start: 2024-12-23 | End: 2024-12-30 | Stop reason: HOSPADM

## 2024-12-23 RX ORDER — MICONAZOLE NITRATE 2 G/100G
POWDER TOPICAL 2 TIMES DAILY
Status: DISCONTINUED | OUTPATIENT
Start: 2024-12-23 | End: 2024-12-30 | Stop reason: HOSPADM

## 2024-12-23 RX ORDER — SIMETHICONE 80 MG
1 TABLET,CHEWABLE ORAL 4 TIMES DAILY PRN
Status: DISCONTINUED | OUTPATIENT
Start: 2024-12-23 | End: 2024-12-30 | Stop reason: HOSPADM

## 2024-12-23 RX ORDER — LEVOTHYROXINE SODIUM 100 UG/1
100 TABLET ORAL DAILY
Status: DISCONTINUED | OUTPATIENT
Start: 2024-12-24 | End: 2024-12-30 | Stop reason: HOSPADM

## 2024-12-23 RX ORDER — LISINOPRIL 20 MG/1
20 TABLET ORAL DAILY
Status: DISCONTINUED | OUTPATIENT
Start: 2024-12-24 | End: 2024-12-28

## 2024-12-23 RX ORDER — IPRATROPIUM BROMIDE AND ALBUTEROL SULFATE 2.5; .5 MG/3ML; MG/3ML
3 SOLUTION RESPIRATORY (INHALATION) 4 TIMES DAILY
Status: DISCONTINUED | OUTPATIENT
Start: 2024-12-23 | End: 2024-12-23

## 2024-12-23 RX ORDER — CEFTRIAXONE 1 G/1
1 INJECTION, POWDER, FOR SOLUTION INTRAMUSCULAR; INTRAVENOUS
Status: DISCONTINUED | OUTPATIENT
Start: 2024-12-23 | End: 2024-12-30 | Stop reason: HOSPADM

## 2024-12-23 RX ORDER — GABAPENTIN 300 MG/1
600 CAPSULE ORAL NIGHTLY
Status: DISCONTINUED | OUTPATIENT
Start: 2024-12-23 | End: 2024-12-23

## 2024-12-23 RX ORDER — WARFARIN SODIUM 5 MG/1
5 TABLET ORAL DAILY
Status: DISCONTINUED | OUTPATIENT
Start: 2024-12-24 | End: 2024-12-30 | Stop reason: HOSPADM

## 2024-12-23 RX ORDER — ONDANSETRON HYDROCHLORIDE 2 MG/ML
4 INJECTION, SOLUTION INTRAVENOUS EVERY 8 HOURS PRN
Status: DISCONTINUED | OUTPATIENT
Start: 2024-12-23 | End: 2024-12-30 | Stop reason: HOSPADM

## 2024-12-23 RX ORDER — LANOLIN ALCOHOL/MO/W.PET/CERES
800 CREAM (GRAM) TOPICAL
Status: DISCONTINUED | OUTPATIENT
Start: 2024-12-23 | End: 2024-12-30 | Stop reason: HOSPADM

## 2024-12-23 RX ADMIN — AZITHROMYCIN MONOHYDRATE 500 MG: 500 INJECTION, POWDER, LYOPHILIZED, FOR SOLUTION INTRAVENOUS at 04:12

## 2024-12-23 RX ADMIN — LORAZEPAM 2 MG: 1 TABLET ORAL at 09:12

## 2024-12-23 RX ADMIN — GUAIFENESIN AND CODEINE PHOSPHATE 5 ML: 100; 10 SOLUTION ORAL at 11:12

## 2024-12-23 RX ADMIN — CEFTRIAXONE SODIUM 1 G: 1 INJECTION, POWDER, FOR SOLUTION INTRAMUSCULAR; INTRAVENOUS at 04:12

## 2024-12-23 RX ADMIN — ACETAMINOPHEN 1000 MG: 500 TABLET, FILM COATED ORAL at 05:12

## 2024-12-23 RX ADMIN — ONDANSETRON 4 MG: 2 INJECTION INTRAMUSCULAR; INTRAVENOUS at 05:12

## 2024-12-23 RX ADMIN — MICONAZOLE NITRATE: 20 POWDER TOPICAL at 10:12

## 2024-12-23 RX ADMIN — IPRATROPIUM BROMIDE AND ALBUTEROL SULFATE 3 ML: .5; 3 SOLUTION RESPIRATORY (INHALATION) at 05:12

## 2024-12-23 NOTE — SUBJECTIVE & OBJECTIVE
Past Medical History:   Diagnosis Date    Arthritis     Asthma     DVT (deep venous thrombosis)     Factor V Leiden mutation     Fibromyalgia     Hypertension        Past Surgical History:   Procedure Laterality Date    BILATERAL TUBAL LIGATION Bilateral     CHOLECYSTECTOMY         Review of patient's allergies indicates:   Allergen Reactions    Pcn [penicillins] Hives    Aspirin     Influenza virus vaccines        No current facility-administered medications on file prior to encounter.     Current Outpatient Medications on File Prior to Encounter   Medication Sig    DULoxetine (CYMBALTA) 60 MG capsule Take 60 mg by mouth once daily.    levothyroxine (SYNTHROID) 100 MCG tablet Take 100 mcg by mouth once daily.    lisinopril (PRINIVIL,ZESTRIL) 20 MG tablet Take 20 mg by mouth once daily.    LORazepam (ATIVAN) 2 MG Tab Take 2 mg by mouth 3 (three) times daily.    tiZANidine (ZANAFLEX) 4 MG tablet Take 1 tablet (4 mg total) by mouth every 8 (eight) hours as needed.    warfarin (COUMADIN) 5 MG tablet Take 5 mg by mouth Daily.    gabapentin (NEURONTIN) 300 MG capsule Take 2 capsules (600 mg total) by mouth every evening.     Family History       Problem Relation (Age of Onset)    Dementia Father    Lymphoma Mother          Tobacco Use    Smoking status: Never    Smokeless tobacco: Never   Substance and Sexual Activity    Alcohol use: Never    Drug use: Never    Sexual activity: Not Currently     Review of Systems   Constitutional:  Positive for activity change, chills, fatigue and fever.   HENT:  Positive for congestion, postnasal drip, rhinorrhea, sinus pressure, sinus pain, sneezing and sore throat.    Eyes: Negative.    Respiratory:  Positive for cough, shortness of breath and wheezing.    Cardiovascular: Negative.    Gastrointestinal: Negative.    Endocrine: Negative.    Genitourinary: Negative.    Musculoskeletal:  Positive for arthralgias and myalgias.   Skin: Negative.    Allergic/Immunologic: Negative.     Neurological:  Positive for weakness.   Hematological: Negative.    Psychiatric/Behavioral:  Negative for agitation, behavioral problems, confusion and hallucinations. The patient is nervous/anxious.      Objective:     Vital Signs (Most Recent):    Vital Signs (24h Range):           There is no height or weight on file to calculate BMI.     Physical Exam  Constitutional:       General: She is not in acute distress.     Appearance: Normal appearance. She is obese.   HENT:      Head: Normocephalic and atraumatic.      Right Ear: Tympanic membrane normal. There is no impacted cerumen.      Left Ear: Tympanic membrane normal.      Nose: Nose normal.      Mouth/Throat:      Mouth: Mucous membranes are moist.      Pharynx: Oropharynx is clear.   Eyes:      Extraocular Movements: Extraocular movements intact.      Conjunctiva/sclera: Conjunctivae normal.      Pupils: Pupils are equal, round, and reactive to light.   Cardiovascular:      Rate and Rhythm: Normal rate and regular rhythm.      Pulses: Normal pulses.      Heart sounds: Normal heart sounds. No murmur heard.     No gallop.   Pulmonary:      Effort: Pulmonary effort is normal. No respiratory distress.      Comments: He has decreased breath sounds throughout.  With diffuse expiratory wheeze.  Abdominal:      General: Abdomen is flat. Bowel sounds are normal. There is no distension.      Palpations: Abdomen is soft.      Tenderness: There is no abdominal tenderness.   Musculoskeletal:         General: Normal range of motion.      Cervical back: Normal range of motion and neck supple.   Skin:     General: Skin is warm and dry.      Capillary Refill: Capillary refill takes less than 2 seconds.      Coloration: Skin is not jaundiced.   Neurological:      General: No focal deficit present.      Mental Status: She is alert and oriented to person, place, and time. Mental status is at baseline.   Psychiatric:         Mood and Affect: Mood normal.         Behavior:  Behavior normal.         Thought Content: Thought content normal.         Judgment: Judgment normal.              CRANIAL NERVES     CN III, IV, VI   Pupils are equal, round, and reactive to light.       Significant Labs: All pertinent labs within the past 24 hours have been reviewed.    Significant Imaging: I have reviewed all pertinent imaging results/findings within the past 24 hours.

## 2024-12-23 NOTE — ASSESSMENT & PLAN NOTE
I suspect that there is an underlying pneumonia though can not hear rales at this time secondary to her wheezing and decreased air movement.  We will get a chest x-ray on admission blood culture sputum culture support with nasal cannula oxygen per respiratory protocol.  We will cover with Rocephin and azithromycin IV as well as albuterol nebs 4 times a day and q.6 PRN.  Have gotten a respiratory PCR and we will follow results tomorrow.  There is also blood culture done as the sputum culture.  We will review chest x-ray results and adjust therapy accordingly.  This may represent exacerbation of her asthma as well we will see what we find on chest x-ray 1st and possibly consider doing steroid treatment as well.

## 2024-12-23 NOTE — H&P
[facility] - Medical Surgical Unit  [facility] Medicine  History & Physical    Patient Name: Mary Aguayo  MRN: [id number]  Patient Class: IP- Inpatient  Admission Date: 12/23/2024  Attending Physician: Reynold Pina MD   Primary Care Provider: Reynold Pina MD         Patient information was obtained from patient and ER records.     Subjective:     Principal Problem:Hypoxemia    Chief Complaint: No chief complaint on file.       HPI: The patient is a 74-year-old  female known to me from clinic.  Presented today with a one-week history of shortness a breath cough with fever of 101.  States she and her  were both ill since last Tuesday.  States began with nasal congestion has since progressed to cough with dyspnea on exertion shortness for breath and wheezing.  Her cough is productive of yellow to green sputum.  She has not had chest pain is not had  hemoptysis.  Her respiration is occasionally painful.      States she has had a decrease in activity has general malaise and myalgia.  No noted rashes.    Her shortness breath is controlled currently with nasal cannula oxygen.  On admission had in our office her sats were in the mid to upper 80s on room air.  After exertion she was in the lower 80s.    Past Medical History:   Diagnosis Date    Arthritis     Asthma     DVT (deep venous thrombosis)     Factor V Leiden mutation     Fibromyalgia     Hypertension        Past Surgical History:   Procedure Laterality Date    BILATERAL TUBAL LIGATION Bilateral     CHOLECYSTECTOMY         Review of patient's allergies indicates:   Allergen Reactions    Pcn [penicillins] Hives    Aspirin     Influenza virus vaccines        No current facility-administered medications on file prior to encounter.     Current Outpatient Medications on File Prior to Encounter   Medication Sig    DULoxetine (CYMBALTA) 60 MG capsule Take 60 mg by mouth once daily.    levothyroxine (SYNTHROID) 100 MCG  tablet Take 100 mcg by mouth once daily.    lisinopril (PRINIVIL,ZESTRIL) 20 MG tablet Take 20 mg by mouth once daily.    LORazepam (ATIVAN) 2 MG Tab Take 2 mg by mouth 3 (three) times daily.    tiZANidine (ZANAFLEX) 4 MG tablet Take 1 tablet (4 mg total) by mouth every 8 (eight) hours as needed.    warfarin (COUMADIN) 5 MG tablet Take 5 mg by mouth Daily.    gabapentin (NEURONTIN) 300 MG capsule Take 2 capsules (600 mg total) by mouth every evening.     Family History       Problem Relation (Age of Onset)    Dementia Father    Lymphoma Mother          Tobacco Use    Smoking status: Never    Smokeless tobacco: Never   Substance and Sexual Activity    Alcohol use: Never    Drug use: Never    Sexual activity: Not Currently     Review of Systems   Constitutional:  Positive for activity change, chills, fatigue and fever.   HENT:  Positive for congestion, postnasal drip, rhinorrhea, sinus pressure, sinus pain, sneezing and sore throat.    Eyes: Negative.    Respiratory:  Positive for cough, shortness of breath and wheezing.    Cardiovascular: Negative.    Gastrointestinal: Negative.    Endocrine: Negative.    Genitourinary: Negative.    Musculoskeletal:  Positive for arthralgias and myalgias.   Skin: Negative.    Allergic/Immunologic: Negative.    Neurological:  Positive for weakness.   Hematological: Negative.    Psychiatric/Behavioral:  Negative for agitation, behavioral problems, confusion and hallucinations. The patient is nervous/anxious.      Objective:     Vital Signs (Most Recent):    Vital Signs (24h Range):           There is no height or weight on file to calculate BMI.     Physical Exam  Constitutional:       General: She is not in acute distress.     Appearance: Normal appearance. She is obese.   HENT:      Head: Normocephalic and atraumatic.      Right Ear: Tympanic membrane normal. There is no impacted cerumen.      Left Ear: Tympanic membrane normal.      Nose: Nose normal.      Mouth/Throat:      Mouth:  Mucous membranes are moist.      Pharynx: Oropharynx is clear.   Eyes:      Extraocular Movements: Extraocular movements intact.      Conjunctiva/sclera: Conjunctivae normal.      Pupils: Pupils are equal, round, and reactive to light.   Cardiovascular:      Rate and Rhythm: Normal rate and regular rhythm.      Pulses: Normal pulses.      Heart sounds: Normal heart sounds. No murmur heard.     No gallop.   Pulmonary:      Effort: Pulmonary effort is normal. No respiratory distress.      Comments: He has decreased breath sounds throughout.  With diffuse expiratory wheeze.  Abdominal:      General: Abdomen is flat. Bowel sounds are normal. There is no distension.      Palpations: Abdomen is soft.      Tenderness: There is no abdominal tenderness.   Musculoskeletal:         General: Normal range of motion.      Cervical back: Normal range of motion and neck supple.   Skin:     General: Skin is warm and dry.      Capillary Refill: Capillary refill takes less than 2 seconds.      Coloration: Skin is not jaundiced.   Neurological:      General: No focal deficit present.      Mental Status: She is alert and oriented to person, place, and time. Mental status is at baseline.   Psychiatric:         Mood and Affect: Mood normal.         Behavior: Behavior normal.         Thought Content: Thought content normal.         Judgment: Judgment normal.              CRANIAL NERVES     CN III, IV, VI   Pupils are equal, round, and reactive to light.       Significant Labs: All pertinent labs within the past 24 hours have been reviewed.    Significant Imaging: I have reviewed all pertinent imaging results/findings within the past 24 hours.  Assessment/Plan:     * Hypoxemia    I suspect that there is an underlying pneumonia though can not hear rales at this time secondary to her wheezing and decreased air movement.  We will get a chest x-ray on admission blood culture sputum culture support with nasal cannula oxygen per respiratory  protocol.  We will cover with Rocephin and azithromycin IV as well as albuterol nebs 4 times a day and q.6 PRN.  Have gotten a respiratory PCR and we will follow results tomorrow.  There is also blood culture done as the sputum culture.  We will review chest x-ray results and adjust therapy accordingly.  This may represent exacerbation of her asthma as well we will see what we find on chest x-ray 1st and possibly consider doing steroid treatment as well.    Moderate persistent asthma with acute exacerbation  We will continue with nebulizations and antibiotics as above.  Consider steroids if necessary      Obesity  There is no height or weight on file to calculate BMI. Morbid obesity complicates all aspects of disease management from diagnostic modalities to treatment. Weight loss encouraged and health benefits explained to patient.         Hypertension  Patient's blood pressure range in the last 24 hours was: No data recorded.The patient's inpatient anti-hypertensive regimen is listed below:  Current Antihypertensives  lisinopriL tablet 20 mg, Daily, Oral    Plan  - BP is controlled, no changes needed to their regimen  - continue home medicines as written    Generalized anxiety disorder  We will continue her home duloxetine and PRN anxiety medications        VTE Risk Mitigation (From admission, onward)           Ordered     warfarin (COUMADIN) tablet 5 mg  Daily         12/23/24 1625     IP VTE HIGH RISK PATIENT  Once         12/23/24 1625     Reason for No Pharmacological VTE Prophylaxis  Once        Question:  Reasons:  Answer:  Already adequately anticoagulated on oral Anticoagulants    12/23/24 1625                                    Reynold Pina MD  Department of Hospital Medicine  [facility] - Medical Surgical Unit

## 2024-12-23 NOTE — HPI
The patient is a 74-year-old  female known to me from clinic.  Presented today with a one-week history of shortness a breath cough with fever of 101.  States she and her  were both ill since last Tuesday.  States began with nasal congestion has since progressed to cough with dyspnea on exertion shortness for breath and wheezing.  Her cough is productive of yellow to green sputum.  She has not had chest pain is not had  hemoptysis.  Her respiration is occasionally painful.      States she has had a decrease in activity has general malaise and myalgia.  No noted rashes.    Her shortness breath is controlled currently with nasal cannula oxygen.  On admission had in our office her sats were in the mid to upper 80s on room air.  After exertion she was in the lower 80s.

## 2024-12-23 NOTE — ASSESSMENT & PLAN NOTE
Patient's blood pressure range in the last 24 hours was: No data recorded.The patient's inpatient anti-hypertensive regimen is listed below:  Current Antihypertensives  lisinopriL tablet 20 mg, Daily, Oral    Plan  - BP is controlled, no changes needed to their regimen  - continue home medicines as written

## 2024-12-24 PROBLEM — J18.9 PNEUMONIA OF LEFT LOWER LOBE DUE TO INFECTIOUS ORGANISM: Status: ACTIVE | Noted: 2024-12-24

## 2024-12-24 LAB
ALBUMIN SERPL-MCNC: 3.2 G/DL (ref 3.4–4.8)
ALBUMIN/GLOB SERPL: 0.7 RATIO (ref 1.1–2)
ALP SERPL-CCNC: 80 UNIT/L (ref 40–150)
ALT SERPL-CCNC: 16 UNIT/L (ref 0–55)
ANION GAP SERPL CALC-SCNC: 14 MEQ/L
AST SERPL-CCNC: 22 UNIT/L (ref 5–34)
BASOPHILS # BLD AUTO: 0.05 X10(3)/MCL
BASOPHILS NFR BLD AUTO: 0.4 %
BILIRUB SERPL-MCNC: 0.3 MG/DL
BUN SERPL-MCNC: 18 MG/DL (ref 9.8–20.1)
CALCIUM SERPL-MCNC: 8.9 MG/DL (ref 8.4–10.2)
CHLORIDE SERPL-SCNC: 99 MMOL/L (ref 98–107)
CO2 SERPL-SCNC: 28 MMOL/L (ref 23–31)
CREAT SERPL-MCNC: 0.81 MG/DL (ref 0.55–1.02)
CREAT/UREA NIT SERPL: 22
EOSINOPHIL # BLD AUTO: 0.03 X10(3)/MCL (ref 0–0.9)
EOSINOPHIL NFR BLD AUTO: 0.2 %
ERYTHROCYTE [DISTWIDTH] IN BLOOD BY AUTOMATED COUNT: 15.4 % (ref 11.5–17)
FLUAV AG UPPER RESP QL IA.RAPID: DETECTED
FLUBV AG UPPER RESP QL IA.RAPID: NOT DETECTED
GFR SERPLBLD CREATININE-BSD FMLA CKD-EPI: >60 ML/MIN/1.73/M2
GLOBULIN SER-MCNC: 4.3 GM/DL (ref 2.4–3.5)
GLUCOSE SERPL-MCNC: 114 MG/DL (ref 82–115)
HCT VFR BLD AUTO: 40.1 % (ref 37–47)
HGB BLD-MCNC: 13.3 G/DL (ref 12–16)
IMM GRANULOCYTES # BLD AUTO: 0.09 X10(3)/MCL (ref 0–0.04)
IMM GRANULOCYTES NFR BLD AUTO: 0.7 %
INR PPP: 1.3
LYMPHOCYTES # BLD AUTO: 2.8 X10(3)/MCL (ref 0.6–4.6)
LYMPHOCYTES NFR BLD AUTO: 23 %
MAGNESIUM SERPL-MCNC: 1.8 MG/DL (ref 1.6–2.6)
MCH RBC QN AUTO: 30.9 PG (ref 27–31)
MCHC RBC AUTO-ENTMCNC: 33.2 G/DL (ref 33–36)
MCV RBC AUTO: 93.3 FL (ref 80–94)
MONOCYTES # BLD AUTO: 0.67 X10(3)/MCL (ref 0.1–1.3)
MONOCYTES NFR BLD AUTO: 5.5 %
NEUTROPHILS # BLD AUTO: 8.56 X10(3)/MCL (ref 2.1–9.2)
NEUTROPHILS NFR BLD AUTO: 70.2 %
PLATELET # BLD AUTO: 272 X10(3)/MCL (ref 130–400)
PMV BLD AUTO: 9.9 FL (ref 7.4–10.4)
POTASSIUM SERPL-SCNC: 3 MMOL/L (ref 3.5–5.1)
PROT SERPL-MCNC: 7.5 GM/DL (ref 5.8–7.6)
PROTHROMBIN TIME: 16.5 SECONDS (ref 12.5–14.5)
RBC # BLD AUTO: 4.3 X10(6)/MCL (ref 4.2–5.4)
RSV A 5' UTR RNA NPH QL NAA+PROBE: NOT DETECTED
SARS-COV-2 RNA RESP QL NAA+PROBE: NOT DETECTED
SODIUM SERPL-SCNC: 141 MMOL/L (ref 136–145)
WBC # BLD AUTO: 12.2 X10(3)/MCL (ref 4.5–11.5)

## 2024-12-24 PROCEDURE — 94640 AIRWAY INHALATION TREATMENT: CPT

## 2024-12-24 PROCEDURE — 25000242 PHARM REV CODE 250 ALT 637 W/ HCPCS: Performed by: FAMILY MEDICINE

## 2024-12-24 PROCEDURE — 83735 ASSAY OF MAGNESIUM: CPT | Performed by: FAMILY MEDICINE

## 2024-12-24 PROCEDURE — 85025 COMPLETE CBC W/AUTO DIFF WBC: CPT | Performed by: FAMILY MEDICINE

## 2024-12-24 PROCEDURE — 11000001 HC ACUTE MED/SURG PRIVATE ROOM

## 2024-12-24 PROCEDURE — 25000003 PHARM REV CODE 250: Performed by: FAMILY MEDICINE

## 2024-12-24 PROCEDURE — 85610 PROTHROMBIN TIME: CPT | Performed by: FAMILY MEDICINE

## 2024-12-24 PROCEDURE — 63600175 PHARM REV CODE 636 W HCPCS: Performed by: FAMILY MEDICINE

## 2024-12-24 PROCEDURE — 80053 COMPREHEN METABOLIC PANEL: CPT | Performed by: FAMILY MEDICINE

## 2024-12-24 PROCEDURE — 99900035 HC TECH TIME PER 15 MIN (STAT)

## 2024-12-24 PROCEDURE — 27000207 HC ISOLATION

## 2024-12-24 PROCEDURE — 0241U COVID/RSV/FLU A&B PCR: CPT | Performed by: FAMILY MEDICINE

## 2024-12-24 PROCEDURE — 36415 COLL VENOUS BLD VENIPUNCTURE: CPT | Performed by: FAMILY MEDICINE

## 2024-12-24 PROCEDURE — 27000221 HC OXYGEN, UP TO 24 HOURS

## 2024-12-24 PROCEDURE — 94761 N-INVAS EAR/PLS OXIMETRY MLT: CPT

## 2024-12-24 RX ORDER — OSELTAMIVIR PHOSPHATE 75 MG/1
75 CAPSULE ORAL 2 TIMES DAILY
Status: DISCONTINUED | OUTPATIENT
Start: 2024-12-24 | End: 2024-12-27

## 2024-12-24 RX ORDER — QUETIAPINE FUMARATE 100 MG/1
400 TABLET, FILM COATED ORAL NIGHTLY
Status: DISCONTINUED | OUTPATIENT
Start: 2024-12-24 | End: 2024-12-30 | Stop reason: HOSPADM

## 2024-12-24 RX ORDER — QUETIAPINE 400 MG/1
400 TABLET, FILM COATED, EXTENDED RELEASE ORAL NIGHTLY
COMMUNITY

## 2024-12-24 RX ORDER — POTASSIUM CHLORIDE 20 MEQ/1
40 TABLET, EXTENDED RELEASE ORAL ONCE
Status: COMPLETED | OUTPATIENT
Start: 2024-12-24 | End: 2024-12-24

## 2024-12-24 RX ORDER — POTASSIUM CHLORIDE 750 MG/1
10 TABLET, EXTENDED RELEASE ORAL ONCE
Status: DISCONTINUED | OUTPATIENT
Start: 2024-12-24 | End: 2024-12-24

## 2024-12-24 RX ADMIN — IPRATROPIUM BROMIDE AND ALBUTEROL SULFATE 3 ML: .5; 3 SOLUTION RESPIRATORY (INHALATION) at 07:12

## 2024-12-24 RX ADMIN — AZITHROMYCIN MONOHYDRATE 500 MG: 500 INJECTION, POWDER, LYOPHILIZED, FOR SOLUTION INTRAVENOUS at 05:12

## 2024-12-24 RX ADMIN — LISINOPRIL 20 MG: 20 TABLET ORAL at 10:12

## 2024-12-24 RX ADMIN — IPRATROPIUM BROMIDE AND ALBUTEROL SULFATE 3 ML: .5; 3 SOLUTION RESPIRATORY (INHALATION) at 11:12

## 2024-12-24 RX ADMIN — IPRATROPIUM BROMIDE AND ALBUTEROL SULFATE 3 ML: .5; 3 SOLUTION RESPIRATORY (INHALATION) at 12:12

## 2024-12-24 RX ADMIN — LORAZEPAM 2 MG: 1 TABLET ORAL at 05:12

## 2024-12-24 RX ADMIN — OSELTAMIVIR PHOSPHATE 75 MG: 75 CAPSULE ORAL at 08:12

## 2024-12-24 RX ADMIN — IPRATROPIUM BROMIDE AND ALBUTEROL SULFATE 3 ML: .5; 3 SOLUTION RESPIRATORY (INHALATION) at 03:12

## 2024-12-24 RX ADMIN — POTASSIUM CHLORIDE 40 MEQ: 1500 TABLET, EXTENDED RELEASE ORAL at 12:12

## 2024-12-24 RX ADMIN — QUETIAPINE FUMARATE 400 MG: 100 TABLET ORAL at 08:12

## 2024-12-24 RX ADMIN — ACETAMINOPHEN 650 MG: 325 TABLET, FILM COATED ORAL at 05:12

## 2024-12-24 RX ADMIN — LORAZEPAM 2 MG: 1 TABLET ORAL at 10:12

## 2024-12-24 RX ADMIN — CEFTRIAXONE SODIUM 1 G: 1 INJECTION, POWDER, FOR SOLUTION INTRAMUSCULAR; INTRAVENOUS at 05:12

## 2024-12-24 RX ADMIN — GUAIFENESIN AND CODEINE PHOSPHATE 5 ML: 100; 10 SOLUTION ORAL at 11:12

## 2024-12-24 RX ADMIN — LEVOTHYROXINE SODIUM 100 MCG: 0.1 TABLET ORAL at 10:12

## 2024-12-24 RX ADMIN — MICONAZOLE NITRATE: 20 POWDER TOPICAL at 12:12

## 2024-12-24 RX ADMIN — LORAZEPAM 2 MG: 1 TABLET ORAL at 08:12

## 2024-12-24 RX ADMIN — WARFARIN SODIUM 5 MG: 5 TABLET ORAL at 05:12

## 2024-12-24 RX ADMIN — SERTRALINE HYDROCHLORIDE 100 MG: 50 TABLET ORAL at 10:12

## 2024-12-24 NOTE — ASSESSMENT & PLAN NOTE
Body mass index is 32.62 kg/m². Morbid obesity complicates all aspects of disease management from diagnostic modalities to treatment. Weight loss encouraged and health benefits explained to patient.

## 2024-12-24 NOTE — PLAN OF CARE
12/24/24 1114   Discharge Assessment   Assessment Type Discharge Planning Assessment   Confirmed/corrected address, phone number and insurance Yes   Confirmed Demographics Correct on Facesheet   Source of Information patient   When was your last doctors appointment? 12/23/24   Communicated MARLO with patient/caregiver Date not available/Unable to determine   Reason For Admission hypoxemia   People in Home spouse   Do you expect to return to your current living situation? Yes   Do you have help at home or someone to help you manage your care at home? Yes   Who are your caregiver(s) and their phone number(s)?    Prior to hospitilization cognitive status: Alert/Oriented   Current cognitive status: Alert/Oriented   Walking or Climbing Stairs Difficulty yes   Walking or Climbing Stairs ambulation difficulty, requires equipment   Mobility Management rollator   Dressing/Bathing Difficulty no   Home Accessibility stairs to enter home   Number of Stairs, Main Entrance three   Stair Railings, Main Entrance railings on both sides of stairs   Home Layout Able to live on 1st floor   Equipment Currently Used at Home rollator   Readmission within 30 days? No   Patient currently being followed by outpatient case management? No   Do you currently have service(s) that help you manage your care at home? No   Do you take prescription medications? Yes   Do you have any problems affording any of your prescribed medications? No   Is the patient taking medications as prescribed? yes   Who is going to help you get home at discharge?    How do you get to doctors appointments? car, drives self   Are you on dialysis? No   Do you take coumadin? Yes   Who monitors your labs? Reynold Pina MD   Discharge Plan A Home   Discharge Plan B Home   DME Needed Upon Discharge  none   Discharge Plan discussed with: Patient   Transition of Care Barriers None   Financial Resource Strain   How hard is it for you to pay for the very basics like  food, housing, medical care, and heating? Not hard   Housing Stability   In the last 12 months, was there a time when you were not able to pay the mortgage or rent on time? N   At any time in the past 12 months, were you homeless or living in a shelter (including now)? N   Transportation Needs   Has the lack of transportation kept you from medical appointments, meetings, work or from getting things needed for daily living? No   Stress   Do you feel stress - tense, restless, nervous, or anxious, or unable to sleep at night because your mind is troubled all the time - these days? To some exte   Social Isolation   How often do you feel lonely or isolated from those around you?  Sometimes   Alcohol Use   Q1: How often do you have a drink containing alcohol? Never   Q2: How many drinks containing alcohol do you have on a typical day when you are drinking? None   Q3: How often do you have six or more drinks on one occasion? Never   Utilities   In the past 12 months has the electric, gas, oil, or water company threatened to shut off services in your home? No   Health Literacy   How often do you need to have someone help you when you read instructions, pamphlets, or other written material from your doctor or pharmacy? Never   OTHER   Name(s) of People in Home [place] ()     Pharmacy: [facility]  Pt states she is indep in adl's at home. Uses a Rollator. No   Pt denies any dc needs at this time.

## 2024-12-24 NOTE — SUBJECTIVE & OBJECTIVE
Past Medical History:   Diagnosis Date    Arthritis     Asthma     DVT (deep venous thrombosis)     Factor V Leiden mutation     Fibromyalgia     Hypertension        Past Surgical History:   Procedure Laterality Date    BILATERAL TUBAL LIGATION Bilateral     CHOLECYSTECTOMY         Review of patient's allergies indicates:   Allergen Reactions    Pcn [penicillins] Hives    Aspirin     Influenza virus vaccines        No current facility-administered medications on file prior to encounter.     Current Outpatient Medications on File Prior to Encounter   Medication Sig    DULoxetine (CYMBALTA) 60 MG capsule Take 60 mg by mouth once daily.    levothyroxine (SYNTHROID) 100 MCG tablet Take 100 mcg by mouth once daily.    lisinopril (PRINIVIL,ZESTRIL) 20 MG tablet Take 20 mg by mouth once daily.    LORazepam (ATIVAN) 2 MG Tab Take 2 mg by mouth 3 (three) times daily.    tiZANidine (ZANAFLEX) 4 MG tablet Take 1 tablet (4 mg total) by mouth every 8 (eight) hours as needed.    warfarin (COUMADIN) 5 MG tablet Take 5 mg by mouth Daily.    gabapentin (NEURONTIN) 300 MG capsule Take 2 capsules (600 mg total) by mouth every evening.     Family History       Problem Relation (Age of Onset)    Dementia Father    Lymphoma Mother          Tobacco Use    Smoking status: Never    Smokeless tobacco: Never   Substance and Sexual Activity    Alcohol use: Never    Drug use: Never    Sexual activity: Not Currently     Review of Systems   Constitutional:  Positive for activity change, chills, fatigue and fever.   HENT:  Positive for congestion, postnasal drip, rhinorrhea, sinus pressure, sinus pain, sneezing and sore throat.    Eyes: Negative.    Respiratory:  Positive for cough, shortness of breath and wheezing.    Cardiovascular: Negative.    Gastrointestinal: Negative.    Endocrine: Negative.    Genitourinary: Negative.    Musculoskeletal:  Positive for arthralgias and myalgias.   Skin: Negative.    Allergic/Immunologic: Negative.     Neurological:  Positive for weakness.   Hematological: Negative.    Psychiatric/Behavioral:  Negative for agitation, behavioral problems, confusion and hallucinations. The patient is nervous/anxious.      Objective:     Vital Signs (Most Recent):  Temp: 99.5 °F (37.5 °C) (12/24/24 0742)  Pulse: 90 (12/24/24 0742)  Resp: 16 (12/24/24 0735)  BP: (!) 167/72 (12/24/24 0742)  SpO2: 97 % (12/24/24 0742) Vital Signs (24h Range):  Temp:  [98.1 °F (36.7 °C)-99.5 °F (37.5 °C)] 99.5 °F (37.5 °C)  Pulse:  [] 90  Resp:  [16-20] 16  SpO2:  [89 %-97 %] 97 %  BP: (130-167)/(71-81) 167/72     Weight: 86.2 kg (190 lb 0.6 oz)  Body mass index is 32.62 kg/m².     Physical Exam  Constitutional:       General: She is not in acute distress.     Appearance: Normal appearance. She is obese.   HENT:      Head: Normocephalic and atraumatic.      Right Ear: Tympanic membrane normal. There is no impacted cerumen.      Left Ear: Tympanic membrane normal.      Nose: Nose normal.      Mouth/Throat:      Mouth: Mucous membranes are moist.      Pharynx: Oropharynx is clear.   Eyes:      Extraocular Movements: Extraocular movements intact.      Conjunctiva/sclera: Conjunctivae normal.      Pupils: Pupils are equal, round, and reactive to light.   Cardiovascular:      Rate and Rhythm: Normal rate and regular rhythm.      Pulses: Normal pulses.      Heart sounds: Normal heart sounds. No murmur heard.     No gallop.   Pulmonary:      Effort: Pulmonary effort is normal. No respiratory distress.      Comments: He has decreased breath sounds throughout.  With diffuse expiratory wheeze.  Abdominal:      General: Abdomen is flat. Bowel sounds are normal. There is no distension.      Palpations: Abdomen is soft.      Tenderness: There is no abdominal tenderness.   Musculoskeletal:         General: Normal range of motion.      Cervical back: Normal range of motion and neck supple.   Skin:     General: Skin is warm and dry.      Capillary Refill:  Capillary refill takes less than 2 seconds.      Coloration: Skin is not jaundiced.   Neurological:      General: No focal deficit present.      Mental Status: She is alert and oriented to person, place, and time. Mental status is at baseline.   Psychiatric:         Mood and Affect: Mood normal.         Behavior: Behavior normal.         Thought Content: Thought content normal.         Judgment: Judgment normal.              CRANIAL NERVES     CN III, IV, VI   Pupils are equal, round, and reactive to light.       Significant Labs: All pertinent labs within the past 24 hours have been reviewed.    Significant Imaging: I have reviewed all pertinent imaging results/findings within the past 24 hours.

## 2024-12-24 NOTE — HOSPITAL COURSE
The patient is a 74-year-old  female known to me from clinic.  Presented today with a one-week history of shortness a breath cough with fever of 101.  States she and her  were both ill since last Tuesday.  States began with nasal congestion has since progressed to cough with dyspnea on exertion shortness for breath and wheezing.  Her cough is productive of yellow to green sputum.  She has not had chest pain is not had  hemoptysis.  Her respiration is occasionally painful.       States she has had a decrease in activity has general malaise and myalgia.  No noted rashes.     Her shortness breath is controlled currently with nasal cannula oxygen.  On admission had in our office her sats were in the mid to upper 80s on room air.  After exertion she was in the lower 80s.  Noted to have influenza a as well as Haemophilus influenzae on admit.  Starting with influenza a infection in her mouth was influenza superimposed pneumonia.  Was covered with Tamiflu throughout her stay as well as the addition of IV antibiotics.  She did gradually clear her infiltrates from chest x-ray and was able to gradually wean off of her oxygen.  Sustaining a sat greater than 96% on room air.  We did have to start IV steroids because of her history of reactive airway disease she reacted well to this and continued to show improvement.   She is feeling better today.  No longer requiring supplemental oxygen with significant improvement.  left lower lobe pneumonia on chest x-ray is clearing.  Likely progression was from a flu initial illness with a secondary bacterial heamophilus pneumonia.  She i was on Rocephin and azithromycin and has had a resolution of her leukocytosis.  She has remained afebrile.  States that shortness breath is  improved. she is little stronger relative to her baseline.    Wheezing resolved     Denies chest pain or palpitations at this time.    Creatinine and potassium are wnl

## 2024-12-24 NOTE — ASSESSMENT & PLAN NOTE
there is an underlying pneumonia.  She has had a decrease in her  wheezing and decreased air movement.  We will get a repeat chest x-ray in the morning and follow blood culture sputum culture.    support with nasal cannula oxygen per respiratory protocol.  We will cover with Rocephin and azithromycin IV as well as albuterol nebs 4 times a day and q.6 PRN.    Have gotten a respiratory PCR and we will follow results     This may represent exacerbation of her asthma as well. we will  consider doing steroid treatment as well if necessary

## 2024-12-24 NOTE — PROGRESS NOTES
[facility]  Hospital Medicine  Progress Note    Patient Name: Mary Aguayo  MRN: [id number]  Patient Class: IP- Inpatient   Admission Date: 12/23/2024  Length of Stay: 1 days  Attending Physician: Reynold Pina MD  Primary Care Provider: Reynold Pina MD        Subjective     Principal Problem:Hypoxemia        HPI:  The patient is a 74-year-old  female known to me from clinic.  Presented today with a one-week history of shortness a breath cough with fever of 101.  States she and her  were both ill since last Tuesday.  States began with nasal congestion has since progressed to cough with dyspnea on exertion shortness for breath and wheezing.  Her cough is productive of yellow to green sputum.  She has not had chest pain is not had  hemoptysis.  Her respiration is occasionally painful.      States she has had a decrease in activity has general malaise and myalgia.  No noted rashes.    Her shortness breath is controlled currently with nasal cannula oxygen.  On admission had in our office her sats were in the mid to upper 80s on room air.  After exertion she was in the lower 80s.    Overview/Hospital Course:  She is feeling somewhat better today.  Still requiring supplemental oxygen that we have been able to her.  She does have an apparent left lower lobe pneumonia chest x-ray.  Likely progression was from a viral initial illness with a secondary bacterial pneumonia.  She is currently on Rocephin and azithromycin and has had a decrease in her leukocytosis.  She has remained afebrile.  States that his shortness breath is slightly improved she is little stronger but still weak relative to her baseline.      Thus far all cultures have been negative respiratory PCR is pending as is a flu COVID.  History does not seem favor current flu or COVID infection.    Denies chest pain or palpitations at this time.      She did have a decrease in potassium 3.0  today it has been ordered p.o. potassium repletion.    Past Medical History:   Diagnosis Date    Arthritis     Asthma     DVT (deep venous thrombosis)     Factor V Leiden mutation     Fibromyalgia     Hypertension        Past Surgical History:   Procedure Laterality Date    BILATERAL TUBAL LIGATION Bilateral     CHOLECYSTECTOMY         Review of patient's allergies indicates:   Allergen Reactions    Pcn [penicillins] Hives    Aspirin     Influenza virus vaccines        No current facility-administered medications on file prior to encounter.     Current Outpatient Medications on File Prior to Encounter   Medication Sig    DULoxetine (CYMBALTA) 60 MG capsule Take 60 mg by mouth once daily.    levothyroxine (SYNTHROID) 100 MCG tablet Take 100 mcg by mouth once daily.    lisinopril (PRINIVIL,ZESTRIL) 20 MG tablet Take 20 mg by mouth once daily.    LORazepam (ATIVAN) 2 MG Tab Take 2 mg by mouth 3 (three) times daily.    tiZANidine (ZANAFLEX) 4 MG tablet Take 1 tablet (4 mg total) by mouth every 8 (eight) hours as needed.    warfarin (COUMADIN) 5 MG tablet Take 5 mg by mouth Daily.    gabapentin (NEURONTIN) 300 MG capsule Take 2 capsules (600 mg total) by mouth every evening.     Family History       Problem Relation (Age of Onset)    Dementia Father    Lymphoma Mother          Tobacco Use    Smoking status: Never    Smokeless tobacco: Never   Substance and Sexual Activity    Alcohol use: Never    Drug use: Never    Sexual activity: Not Currently     Review of Systems   Constitutional:  Positive for activity change, chills, fatigue and fever.   HENT:  Positive for congestion, postnasal drip, rhinorrhea, sinus pressure, sinus pain, sneezing and sore throat.    Eyes: Negative.    Respiratory:  Positive for cough, shortness of breath and wheezing.    Cardiovascular: Negative.    Gastrointestinal: Negative.    Endocrine: Negative.    Genitourinary: Negative.    Musculoskeletal:  Positive for arthralgias and myalgias.   Skin:  Negative.    Allergic/Immunologic: Negative.    Neurological:  Positive for weakness.   Hematological: Negative.    Psychiatric/Behavioral:  Negative for agitation, behavioral problems, confusion and hallucinations. The patient is nervous/anxious.      Objective:     Vital Signs (Most Recent):  Temp: 99.5 °F (37.5 °C) (12/24/24 0742)  Pulse: 90 (12/24/24 0742)  Resp: 16 (12/24/24 0735)  BP: (!) 167/72 (12/24/24 0742)  SpO2: 97 % (12/24/24 0742) Vital Signs (24h Range):  Temp:  [98.1 °F (36.7 °C)-99.5 °F (37.5 °C)] 99.5 °F (37.5 °C)  Pulse:  [] 90  Resp:  [16-20] 16  SpO2:  [89 %-97 %] 97 %  BP: (130-167)/(71-81) 167/72     Weight: 86.2 kg (190 lb 0.6 oz)  Body mass index is 32.62 kg/m².     Physical Exam  Constitutional:       General: She is not in acute distress.     Appearance: Normal appearance. She is obese.   HENT:      Head: Normocephalic and atraumatic.      Right Ear: Tympanic membrane normal. There is no impacted cerumen.      Left Ear: Tympanic membrane normal.      Nose: Nose normal.      Mouth/Throat:      Mouth: Mucous membranes are moist.      Pharynx: Oropharynx is clear.   Eyes:      Extraocular Movements: Extraocular movements intact.      Conjunctiva/sclera: Conjunctivae normal.      Pupils: Pupils are equal, round, and reactive to light.   Cardiovascular:      Rate and Rhythm: Normal rate and regular rhythm.      Pulses: Normal pulses.      Heart sounds: Normal heart sounds. No murmur heard.     No gallop.   Pulmonary:      Effort: Pulmonary effort is normal. No respiratory distress.      Comments: He has decreased breath sounds throughout.  With diffuse expiratory wheeze.  Abdominal:      General: Abdomen is flat. Bowel sounds are normal. There is no distension.      Palpations: Abdomen is soft.      Tenderness: There is no abdominal tenderness.   Musculoskeletal:         General: Normal range of motion.      Cervical back: Normal range of motion and neck supple.   Skin:     General: Skin  is warm and dry.      Capillary Refill: Capillary refill takes less than 2 seconds.      Coloration: Skin is not jaundiced.   Neurological:      General: No focal deficit present.      Mental Status: She is alert and oriented to person, place, and time. Mental status is at baseline.   Psychiatric:         Mood and Affect: Mood normal.         Behavior: Behavior normal.         Thought Content: Thought content normal.         Judgment: Judgment normal.              CRANIAL NERVES     CN III, IV, VI   Pupils are equal, round, and reactive to light.       Significant Labs: All pertinent labs within the past 24 hours have been reviewed.    Significant Imaging: I have reviewed all pertinent imaging results/findings within the past 24 hours.    Assessment and Plan     * Hypoxemia     there is an underlying pneumonia.  She has had a decrease in her  wheezing and decreased air movement.  We will get a repeat chest x-ray in the morning and follow blood culture sputum culture.    support with nasal cannula oxygen per respiratory protocol.  We will cover with Rocephin and azithromycin IV as well as albuterol nebs 4 times a day and q.6 PRN.    Have gotten a respiratory PCR and we will follow results     This may represent exacerbation of her asthma as well. we will  consider doing steroid treatment as well if necessary    Moderate persistent asthma with acute exacerbation  We will continue with nebulizations and antibiotics as above.  Consider steroids if necessary      Obesity  Body mass index is 32.62 kg/m². Morbid obesity complicates all aspects of disease management from diagnostic modalities to treatment. Weight loss encouraged and health benefits explained to patient.         Hypertension  Patient's blood pressure range in the last 24 hours was: BP  Min: 130/71  Max: 167/72.The patient's inpatient anti-hypertensive regimen is listed below:  Current Antihypertensives  lisinopriL tablet 20 mg, Daily, Oral    Plan  - BP is  controlled, no changes needed to their regimen  - continue home medicines as written  One elevated pressure this morning the rest of the measurements are controlled, continue meds as currently written and closely monitor    Generalized anxiety disorder  We will continue her home sertraline and PRN anxiety medications        VTE Risk Mitigation (From admission, onward)           Ordered     warfarin (COUMADIN) tablet 5 mg  Daily         12/23/24 1625     IP VTE HIGH RISK PATIENT  Once         12/23/24 1625     Reason for No Pharmacological VTE Prophylaxis  Once        Question:  Reasons:  Answer:  Already adequately anticoagulated on oral Anticoagulants    12/23/24 1625                    Discharge Planning   MARLO:      Code Status: Full Code   Medical Readiness for Discharge Date:                            Reynold Pina MD  Department of Hospital Medicine   [facility]

## 2024-12-24 NOTE — ASSESSMENT & PLAN NOTE
Patient's blood pressure range in the last 24 hours was: BP  Min: 130/71  Max: 167/72.The patient's inpatient anti-hypertensive regimen is listed below:  Current Antihypertensives  lisinopriL tablet 20 mg, Daily, Oral    Plan  - BP is controlled, no changes needed to their regimen  - continue home medicines as written  One elevated pressure this morning the rest of the measurements are controlled, continue meds as currently written and closely monitor

## 2024-12-24 NOTE — PLAN OF CARE
Problem: Adult Inpatient Plan of Care  Goal: Plan of Care Review  12/24/2024 0848 by Alma Paiz LPN  Outcome: Progressing  12/24/2024 0848 by Alma Paiz LPN  Outcome: Progressing  Goal: Patient-Specific Goal (Individualized)  12/24/2024 0848 by Alma Paiz LPN  Outcome: Progressing  12/24/2024 0848 by Alma Paiz LPN  Outcome: Progressing  Goal: Absence of Hospital-Acquired Illness or Injury  12/24/2024 0848 by Alma Paiz LPN  Outcome: Progressing  12/24/2024 0848 by Alma Paiz LPN  Outcome: Progressing  Goal: Optimal Comfort and Wellbeing  12/24/2024 0848 by Alma Paiz LPN  Outcome: Progressing  12/24/2024 0848 by Alma Paiz LPN  Outcome: Progressing  Goal: Readiness for Transition of Care  12/24/2024 0848 by Alma Paiz LPN  Outcome: Progressing  12/24/2024 0848 by Alma Paiz LPN  Outcome: Progressing     Problem: Wound  Goal: Optimal Coping  12/24/2024 0848 by Alma Paiz LPN  Outcome: Progressing  12/24/2024 0848 by Alma Paiz LPN  Outcome: Progressing  Goal: Optimal Functional Ability  12/24/2024 0848 by Alma Paiz LPN  Outcome: Progressing  12/24/2024 0848 by Alma Paiz LPN  Outcome: Progressing  Goal: Absence of Infection Signs and Symptoms  12/24/2024 0848 by Alma Paiz LPN  Outcome: Progressing  12/24/2024 0848 by Alma Paiz LPN  Outcome: Progressing  Goal: Improved Oral Intake  12/24/2024 0848 by Alma Paiz LPN  Outcome: Progressing  12/24/2024 0848 by Alma Paiz LPN  Outcome: Progressing  Goal: Optimal Pain Control and Function  12/24/2024 0848 by Alma Paiz LPN  Outcome: Progressing  12/24/2024 0848 by Alma Paiz LPN  Outcome: Progressing  Goal: Skin Health and Integrity  12/24/2024 0848 by Alma Paiz LPN  Outcome: Progressing  12/24/2024 0848 by Alma Paiz LPN  Outcome: Progressing  Goal: Optimal Wound  Healing  12/24/2024 0848 by Alma Paiz LPN  Outcome: Progressing  12/24/2024 0848 by Alma Paiz LPN  Outcome: Progressing

## 2024-12-25 PROBLEM — J10.1 INFLUENZA A: Status: ACTIVE | Noted: 2024-12-25

## 2024-12-25 LAB
ABS NEUT CALC (OHS): 3.56 X10(3)/MCL (ref 2.1–9.2)
ANION GAP SERPL CALC-SCNC: 10 MEQ/L
BUN SERPL-MCNC: 25 MG/DL (ref 9.8–20.1)
CALCIUM SERPL-MCNC: 8.6 MG/DL (ref 8.4–10.2)
CHLORIDE SERPL-SCNC: 101 MMOL/L (ref 98–107)
CO2 SERPL-SCNC: 30 MMOL/L (ref 23–31)
CREAT SERPL-MCNC: 1.45 MG/DL (ref 0.55–1.02)
CREAT/UREA NIT SERPL: 17
EOSINOPHIL NFR BLD MANUAL: 0.08 X10(3)/MCL (ref 0–0.9)
EOSINOPHIL NFR BLD MANUAL: 1 % (ref 0–8)
ERYTHROCYTE [DISTWIDTH] IN BLOOD BY AUTOMATED COUNT: 15.7 % (ref 11.5–17)
GFR SERPLBLD CREATININE-BSD FMLA CKD-EPI: 38 ML/MIN/1.73/M2
GLUCOSE SERPL-MCNC: 97 MG/DL (ref 82–115)
HCT VFR BLD AUTO: 40.3 % (ref 37–47)
HGB BLD-MCNC: 12.9 G/DL (ref 12–16)
LYMPHOCYTES NFR BLD MANUAL: 4.23 X10(3)/MCL
LYMPHOCYTES NFR BLD MANUAL: 51 % (ref 13–40)
MAGNESIUM SERPL-MCNC: 2.2 MG/DL (ref 1.6–2.6)
MCH RBC QN AUTO: 31.2 PG (ref 27–31)
MCHC RBC AUTO-ENTMCNC: 32 G/DL (ref 33–36)
MCV RBC AUTO: 97.6 FL (ref 80–94)
MONOCYTES NFR BLD MANUAL: 0.41 X10(3)/MCL (ref 0.1–1.3)
MONOCYTES NFR BLD MANUAL: 5 % (ref 2–11)
NEUTROPHILS NFR BLD MANUAL: 41 % (ref 47–80)
NEUTS BAND NFR BLD MANUAL: 2 % (ref 0–11)
PLATELET # BLD AUTO: 256 X10(3)/MCL (ref 130–400)
PLATELET # BLD EST: ADEQUATE 10*3/UL
PMV BLD AUTO: 9.6 FL (ref 7.4–10.4)
POTASSIUM SERPL-SCNC: 3.3 MMOL/L (ref 3.5–5.1)
RBC # BLD AUTO: 4.13 X10(6)/MCL (ref 4.2–5.4)
RBC MORPH BLD: NORMAL
SODIUM SERPL-SCNC: 141 MMOL/L (ref 136–145)
WBC # BLD AUTO: 8.29 X10(3)/MCL (ref 4.5–11.5)

## 2024-12-25 PROCEDURE — 83735 ASSAY OF MAGNESIUM: CPT | Performed by: FAMILY MEDICINE

## 2024-12-25 PROCEDURE — 94799 UNLISTED PULMONARY SVC/PX: CPT | Mod: XB

## 2024-12-25 PROCEDURE — 63600175 PHARM REV CODE 636 W HCPCS: Performed by: FAMILY MEDICINE

## 2024-12-25 PROCEDURE — 27000221 HC OXYGEN, UP TO 24 HOURS

## 2024-12-25 PROCEDURE — 25000242 PHARM REV CODE 250 ALT 637 W/ HCPCS: Performed by: FAMILY MEDICINE

## 2024-12-25 PROCEDURE — 36415 COLL VENOUS BLD VENIPUNCTURE: CPT | Performed by: FAMILY MEDICINE

## 2024-12-25 PROCEDURE — 25000003 PHARM REV CODE 250: Performed by: FAMILY MEDICINE

## 2024-12-25 PROCEDURE — 99900035 HC TECH TIME PER 15 MIN (STAT)

## 2024-12-25 PROCEDURE — 80048 BASIC METABOLIC PNL TOTAL CA: CPT | Performed by: FAMILY MEDICINE

## 2024-12-25 PROCEDURE — 94640 AIRWAY INHALATION TREATMENT: CPT

## 2024-12-25 PROCEDURE — 94761 N-INVAS EAR/PLS OXIMETRY MLT: CPT

## 2024-12-25 PROCEDURE — 85025 COMPLETE CBC W/AUTO DIFF WBC: CPT | Performed by: FAMILY MEDICINE

## 2024-12-25 PROCEDURE — 27000207 HC ISOLATION

## 2024-12-25 PROCEDURE — 11000001 HC ACUTE MED/SURG PRIVATE ROOM

## 2024-12-25 RX ORDER — SODIUM CHLORIDE AND POTASSIUM CHLORIDE 150; 450 MG/100ML; MG/100ML
INJECTION, SOLUTION INTRAVENOUS CONTINUOUS
Status: DISCONTINUED | OUTPATIENT
Start: 2024-12-25 | End: 2024-12-25

## 2024-12-25 RX ORDER — FUROSEMIDE 10 MG/ML
20 INJECTION INTRAMUSCULAR; INTRAVENOUS ONCE
Status: COMPLETED | OUTPATIENT
Start: 2024-12-25 | End: 2024-12-25

## 2024-12-25 RX ADMIN — METHYLPREDNISOLONE SODIUM SUCCINATE 80 MG: 40 INJECTION, POWDER, FOR SOLUTION INTRAMUSCULAR; INTRAVENOUS at 08:12

## 2024-12-25 RX ADMIN — LORAZEPAM 2 MG: 1 TABLET ORAL at 09:12

## 2024-12-25 RX ADMIN — QUETIAPINE FUMARATE 400 MG: 100 TABLET ORAL at 08:12

## 2024-12-25 RX ADMIN — IPRATROPIUM BROMIDE AND ALBUTEROL SULFATE 3 ML: .5; 3 SOLUTION RESPIRATORY (INHALATION) at 07:12

## 2024-12-25 RX ADMIN — MICONAZOLE NITRATE: 20 POWDER TOPICAL at 08:12

## 2024-12-25 RX ADMIN — LORAZEPAM 2 MG: 1 TABLET ORAL at 08:12

## 2024-12-25 RX ADMIN — IPRATROPIUM BROMIDE AND ALBUTEROL SULFATE 3 ML: .5; 3 SOLUTION RESPIRATORY (INHALATION) at 10:12

## 2024-12-25 RX ADMIN — METHYLPREDNISOLONE SODIUM SUCCINATE 80 MG: 40 INJECTION, POWDER, FOR SOLUTION INTRAMUSCULAR; INTRAVENOUS at 10:12

## 2024-12-25 RX ADMIN — OSELTAMIVIR PHOSPHATE 75 MG: 75 CAPSULE ORAL at 09:12

## 2024-12-25 RX ADMIN — MICONAZOLE NITRATE: 20 POWDER TOPICAL at 09:12

## 2024-12-25 RX ADMIN — FUROSEMIDE 20 MG: 10 INJECTION, SOLUTION INTRAMUSCULAR; INTRAVENOUS at 10:12

## 2024-12-25 RX ADMIN — SERTRALINE HYDROCHLORIDE 100 MG: 50 TABLET ORAL at 09:12

## 2024-12-25 RX ADMIN — GUAIFENESIN AND CODEINE PHOSPHATE 5 ML: 100; 10 SOLUTION ORAL at 01:12

## 2024-12-25 RX ADMIN — AZITHROMYCIN MONOHYDRATE 500 MG: 500 INJECTION, POWDER, LYOPHILIZED, FOR SOLUTION INTRAVENOUS at 05:12

## 2024-12-25 RX ADMIN — IPRATROPIUM BROMIDE AND ALBUTEROL SULFATE 3 ML: .5; 3 SOLUTION RESPIRATORY (INHALATION) at 12:12

## 2024-12-25 RX ADMIN — WARFARIN SODIUM 5 MG: 5 TABLET ORAL at 05:12

## 2024-12-25 RX ADMIN — OSELTAMIVIR PHOSPHATE 75 MG: 75 CAPSULE ORAL at 08:12

## 2024-12-25 RX ADMIN — LORAZEPAM 2 MG: 1 TABLET ORAL at 05:12

## 2024-12-25 RX ADMIN — CEFTRIAXONE SODIUM 1 G: 1 INJECTION, POWDER, FOR SOLUTION INTRAMUSCULAR; INTRAVENOUS at 06:12

## 2024-12-25 RX ADMIN — LEVOTHYROXINE SODIUM 100 MCG: 0.1 TABLET ORAL at 09:12

## 2024-12-25 RX ADMIN — LISINOPRIL 20 MG: 20 TABLET ORAL at 09:12

## 2024-12-25 RX ADMIN — GUAIFENESIN AND CODEINE PHOSPHATE 5 ML: 100; 10 SOLUTION ORAL at 08:12

## 2024-12-25 RX ADMIN — IPRATROPIUM BROMIDE AND ALBUTEROL SULFATE 3 ML: .5; 3 SOLUTION RESPIRATORY (INHALATION) at 03:12

## 2024-12-25 NOTE — PROGRESS NOTES
[facility] - [facility] Surgical Unit  Hospital Medicine  Progress Note    Patient Name: Mary Aguayo  MRN: [id number]  Patient Class: IP- Inpatient   Admission Date: 12/23/2024  Length of Stay: 2 days  Attending Physician: Reynold Pina MD  Primary Care Provider: Reynold Pina MD        Subjective     Principal Problem:Hypoxemia        HPI:  The patient is a 74-year-old  female known to me from clinic.  Presented today with a one-week history of shortness a breath cough with fever of 101.  States she and her  were both ill since last Tuesday.  States began with nasal congestion has since progressed to cough with dyspnea on exertion shortness for breath and wheezing.  Her cough is productive of yellow to green sputum.  She has not had chest pain is not had  hemoptysis.  Her respiration is occasionally painful.      States she has had a decrease in activity has general malaise and myalgia.  No noted rashes.    Her shortness breath is controlled currently with nasal cannula oxygen.  On admission had in our office her sats were in the mid to upper 80s on room air.  After exertion she was in the lower 80s.    Overview/Hospital Course:  She is feeling somewhat better today.  Still requiring supplemental oxygen that we have been able to her.  She does have an apparent left lower lobe pneumonia chest x-ray.  Likely progression was from a viral initial illness with a secondary bacterial pneumonia.  She is currently on Rocephin and azithromycin and has had a decrease in her leukocytosis.  She has remained afebrile.  States that his shortness breath is slightly improved she is little stronger but still weak relative to her baseline.      Thus far all cultures have been negative respiratory PCR is pending    flu is positive  Denies chest pain or palpitations at this time.      She did have a decrease in potassium, improved to 3.3 with  p.o. potassium repletion. But creatinine  increased  Still corse, wheeze and cough breath sounds    Past Medical History:   Diagnosis Date    Arthritis     Asthma     DVT (deep venous thrombosis)     Factor V Leiden mutation     Fibromyalgia     Hypertension        Past Surgical History:   Procedure Laterality Date    BILATERAL TUBAL LIGATION Bilateral     CHOLECYSTECTOMY         Review of patient's allergies indicates:   Allergen Reactions    Pcn [penicillins] Hives    Aspirin     Influenza virus vaccines        No current facility-administered medications on file prior to encounter.     Current Outpatient Medications on File Prior to Encounter   Medication Sig    DULoxetine (CYMBALTA) 60 MG capsule Take 60 mg by mouth once daily.    levothyroxine (SYNTHROID) 100 MCG tablet Take 100 mcg by mouth once daily.    lisinopril (PRINIVIL,ZESTRIL) 20 MG tablet Take 20 mg by mouth once daily.    LORazepam (ATIVAN) 2 MG Tab Take 2 mg by mouth 3 (three) times daily.    QUEtiapine (SEROQUEL XR) 400 MG Tb24 Take 400 mg by mouth every evening.    tiZANidine (ZANAFLEX) 4 MG tablet Take 1 tablet (4 mg total) by mouth every 8 (eight) hours as needed.    warfarin (COUMADIN) 5 MG tablet Take 5 mg by mouth Daily.    gabapentin (NEURONTIN) 300 MG capsule Take 2 capsules (600 mg total) by mouth every evening.     Family History       Problem Relation (Age of Onset)    Dementia Father    Lymphoma Mother          Tobacco Use    Smoking status: Never    Smokeless tobacco: Never   Substance and Sexual Activity    Alcohol use: Never    Drug use: Never    Sexual activity: Not Currently     Review of Systems   Constitutional:  Positive for activity change, chills, fatigue and fever.   HENT:  Positive for congestion, postnasal drip, rhinorrhea, sinus pressure, sinus pain, sneezing and sore throat.    Eyes: Negative.    Respiratory:  Positive for cough, shortness of breath and wheezing.    Cardiovascular: Negative.    Gastrointestinal: Negative.    Endocrine: Negative.    Genitourinary:  Negative.    Musculoskeletal:  Positive for arthralgias and myalgias.   Skin: Negative.    Allergic/Immunologic: Negative.    Neurological:  Positive for weakness.   Hematological: Negative.    Psychiatric/Behavioral:  Negative for agitation, behavioral problems, confusion and hallucinations. The patient is nervous/anxious.      Objective:     Vital Signs (Most Recent):  Temp: 97.6 °F (36.4 °C) (12/25/24 0450)  Pulse: 100 (12/25/24 0712)  Resp: 16 (12/25/24 0712)  BP: 111/70 (12/25/24 0450)  SpO2: (!) 91 % (12/25/24 0712) Vital Signs (24h Range):  Temp:  [97.4 °F (36.3 °C)-99 °F (37.2 °C)] 97.6 °F (36.4 °C)  Pulse:  [] 100  Resp:  [16-22] 16  SpO2:  [90 %-98 %] 91 %  BP: (111-154)/(68-78) 111/70     Weight: 86.2 kg (190 lb 0.6 oz)  Body mass index is 32.62 kg/m².     Physical Exam  Constitutional:       General: She is not in acute distress.     Appearance: Normal appearance. She is obese.   HENT:      Head: Normocephalic and atraumatic.      Right Ear: Tympanic membrane normal. There is no impacted cerumen.      Left Ear: Tympanic membrane normal.      Nose: Nose normal.      Mouth/Throat:      Mouth: Mucous membranes are moist.      Pharynx: Oropharynx is clear.   Eyes:      Extraocular Movements: Extraocular movements intact.      Conjunctiva/sclera: Conjunctivae normal.      Pupils: Pupils are equal, round, and reactive to light.   Cardiovascular:      Rate and Rhythm: Normal rate and regular rhythm.      Pulses: Normal pulses.      Heart sounds: Normal heart sounds. No murmur heard.     No gallop.   Pulmonary:      Effort: Pulmonary effort is normal. No respiratory distress.      Comments: He has decreased breath sounds throughout.  With diffuse expiratory wheeze.  Some course rales  Abdominal:      General: Abdomen is flat. Bowel sounds are normal. There is no distension.      Palpations: Abdomen is soft.      Tenderness: There is no abdominal tenderness.   Musculoskeletal:         General: Normal range  of motion.      Cervical back: Normal range of motion and neck supple.   Skin:     General: Skin is warm and dry.      Capillary Refill: Capillary refill takes less than 2 seconds.      Coloration: Skin is not jaundiced.   Neurological:      General: No focal deficit present.      Mental Status: She is alert and oriented to person, place, and time. Mental status is at baseline.   Psychiatric:         Mood and Affect: Mood normal.         Behavior: Behavior normal.         Thought Content: Thought content normal.         Judgment: Judgment normal.              CRANIAL NERVES     CN III, IV, VI   Pupils are equal, round, and reactive to light.       Significant Labs: All pertinent labs within the past 24 hours have been reviewed.    Significant Imaging: I have reviewed all pertinent imaging results/findings within the past 24 hours.    Assessment and Plan     * Hypoxemia     there is an underlying pneumonia.  She has  wheezing and decreased air movement.    follow blood culture sputum culture.    support with nasal cannula oxygen per respiratory protocol.  We will cover with Rocephin and azithromycin IV as well as albuterol nebs 4 times a day and q.6 PRN.    Have gotten a respiratory PCR and we will follow results     This may represent exacerbation of her asthma as well.     Pneumonia of left lower lobe due to infectious organism  Patient has a diagnosis of pneumonia. The cause of the pneumonia is suspected to be bacterial in etiology but organism is not known. The pneumonia is stable. The patient has the following signs/symptoms of pneumonia: cough, sputum production, and shortness of breath. The patient doeshave a current oxygen requirement and the patient does not have a home oxygen requirement. I have reviewed the pertinent imaging. The following cultures have been collected: Blood cultures and Sputum culture The culture results are listed below.     Current antimicrobial regimen consists of the antibiotics  listed below. Will monitor patient closely and continue current treatment plan unchanged.    Antibiotics (From admission, onward)      Start     Stop Route Frequency Ordered    12/23/24 1715  cefTRIAXone injection 1 g         -- IV Every 24 hours (non-standard times) 12/23/24 1625    12/23/24 1715  azithromycin (ZITHROMAX) 500 mg in 0.9% NaCl 250 mL IVPB (admixture device)         -- IV Every 24 hours (non-standard times) 12/23/24 1625            Microbiology Results (last 7 days)       Procedure Component Value Units Date/Time    Respiratory Culture [[id number]] Collected: 12/23/24 1706    Order Status: Completed Specimen: Sputum, Expectorated Updated: 12/25/24 0753     Respiratory Culture Normal respiratory rosalind     GRAM STAIN Quality 3+      Many Gram Negative Rods      Many Gram positive cocci    Blood Culture [[id number]] Collected: 12/23/24 1645    Order Status: Resulted Specimen: Blood from Antecubital, Right Updated: 12/23/24 1708    Blood Culture [[id number]] Collected: 12/23/24 1640    Order Status: Resulted Specimen: Blood from Arm, Left Updated: 12/23/24 1650        I added tamiflu yesterday for flu coverage    Moderate persistent asthma with acute exacerbation  We will continue with nebulizations and antibiotics as above. Initiate iv solumedrol 80 mg q-12    Obesity  Body mass index is 32.62 kg/m². Morbid obesity complicates all aspects of disease management from diagnostic modalities to treatment. Weight loss encouraged and health benefits explained to patient.         Hypertension  Patient's blood pressure range in the last 24 hours was: BP  Min: 111/70  Max: 154/78.The patient's inpatient anti-hypertensive regimen is listed below:  Current Antihypertensives  lisinopriL tablet 20 mg, Daily, Oral  furosemide injection 20 mg, Once, Intravenous    Plan  - BP is controlled, no changes needed to their regimen  - continue home medicines as written  Lasix 20 mg iv this am  One elevated pressure this morning  the rest of the measurements are controlled, continue meds as currently written and closely monitor    Generalized anxiety disorder  We will continue her home sertraline and PRN anxiety medications        VTE Risk Mitigation (From admission, onward)           Ordered     warfarin (COUMADIN) tablet 5 mg  Daily         12/23/24 1625     IP VTE HIGH RISK PATIENT  Once         12/23/24 1625     Reason for No Pharmacological VTE Prophylaxis  Once        Question:  Reasons:  Answer:  Already adequately anticoagulated on oral Anticoagulants    12/23/24 1625                    Discharge Planning   MARLO:      Code Status: Full Code   Medical Readiness for Discharge Date:   Discharge Plan A: Home                        Reynold Pina MD  Department of Hospital Medicine   [facility] - [facility]

## 2024-12-25 NOTE — ASSESSMENT & PLAN NOTE
Patient's blood pressure range in the last 24 hours was: BP  Min: 111/70  Max: 154/78.The patient's inpatient anti-hypertensive regimen is listed below:  Current Antihypertensives  lisinopriL tablet 20 mg, Daily, Oral  furosemide injection 20 mg, Once, Intravenous    Plan  - BP is controlled, no changes needed to their regimen  - continue home medicines as written  Lasix 20 mg iv this am  One elevated pressure this morning the rest of the measurements are controlled, continue meds as currently written and closely monitor

## 2024-12-25 NOTE — PLAN OF CARE
Problem: Adult Inpatient Plan of Care  Goal: Plan of Care Review  Outcome: Progressing  Goal: Patient-Specific Goal (Individualized)  Outcome: Progressing  Goal: Absence of Hospital-Acquired Illness or Injury  Outcome: Progressing  Goal: Optimal Comfort and Wellbeing  Outcome: Progressing  Goal: Readiness for Transition of Care  Outcome: Progressing     Problem: Wound  Goal: Optimal Coping  Outcome: Progressing  Goal: Optimal Functional Ability  Outcome: Progressing  Goal: Absence of Infection Signs and Symptoms  Outcome: Progressing  Goal: Improved Oral Intake  Outcome: Progressing  Goal: Optimal Pain Control and Function  Outcome: Progressing  Goal: Skin Health and Integrity  Outcome: Progressing  Goal: Optimal Wound Healing  Outcome: Progressing     Problem: Pneumonia  Goal: Fluid Balance  Reactivated  Goal: Resolution of Infection Signs and Symptoms  Reactivated  Goal: Effective Oxygenation and Ventilation  Reactivated

## 2024-12-25 NOTE — ASSESSMENT & PLAN NOTE
there is an underlying pneumonia.  She has  wheezing and decreased air movement.    follow blood culture sputum culture.    support with nasal cannula oxygen per respiratory protocol.  We will cover with Rocephin and azithromycin IV as well as albuterol nebs 4 times a day and q.6 PRN.    Have gotten a respiratory PCR and we will follow results     This may represent exacerbation of her asthma as well.

## 2024-12-25 NOTE — SUBJECTIVE & OBJECTIVE
Past Medical History:   Diagnosis Date    Arthritis     Asthma     DVT (deep venous thrombosis)     Factor V Leiden mutation     Fibromyalgia     Hypertension        Past Surgical History:   Procedure Laterality Date    BILATERAL TUBAL LIGATION Bilateral     CHOLECYSTECTOMY         Review of patient's allergies indicates:   Allergen Reactions    Pcn [penicillins] Hives    Aspirin     Influenza virus vaccines        No current facility-administered medications on file prior to encounter.     Current Outpatient Medications on File Prior to Encounter   Medication Sig    DULoxetine (CYMBALTA) 60 MG capsule Take 60 mg by mouth once daily.    levothyroxine (SYNTHROID) 100 MCG tablet Take 100 mcg by mouth once daily.    lisinopril (PRINIVIL,ZESTRIL) 20 MG tablet Take 20 mg by mouth once daily.    LORazepam (ATIVAN) 2 MG Tab Take 2 mg by mouth 3 (three) times daily.    QUEtiapine (SEROQUEL XR) 400 MG Tb24 Take 400 mg by mouth every evening.    tiZANidine (ZANAFLEX) 4 MG tablet Take 1 tablet (4 mg total) by mouth every 8 (eight) hours as needed.    warfarin (COUMADIN) 5 MG tablet Take 5 mg by mouth Daily.    gabapentin (NEURONTIN) 300 MG capsule Take 2 capsules (600 mg total) by mouth every evening.     Family History       Problem Relation (Age of Onset)    Dementia Father    Lymphoma Mother          Tobacco Use    Smoking status: Never    Smokeless tobacco: Never   Substance and Sexual Activity    Alcohol use: Never    Drug use: Never    Sexual activity: Not Currently     Review of Systems   Constitutional:  Positive for activity change, chills, fatigue and fever.   HENT:  Positive for congestion, postnasal drip, rhinorrhea, sinus pressure, sinus pain, sneezing and sore throat.    Eyes: Negative.    Respiratory:  Positive for cough, shortness of breath and wheezing.    Cardiovascular: Negative.    Gastrointestinal: Negative.    Endocrine: Negative.    Genitourinary: Negative.    Musculoskeletal:  Positive for arthralgias  and myalgias.   Skin: Negative.    Allergic/Immunologic: Negative.    Neurological:  Positive for weakness.   Hematological: Negative.    Psychiatric/Behavioral:  Negative for agitation, behavioral problems, confusion and hallucinations. The patient is nervous/anxious.      Objective:     Vital Signs (Most Recent):  Temp: 97.6 °F (36.4 °C) (12/25/24 0450)  Pulse: 100 (12/25/24 0712)  Resp: 16 (12/25/24 0712)  BP: 111/70 (12/25/24 0450)  SpO2: (!) 91 % (12/25/24 0712) Vital Signs (24h Range):  Temp:  [97.4 °F (36.3 °C)-99 °F (37.2 °C)] 97.6 °F (36.4 °C)  Pulse:  [] 100  Resp:  [16-22] 16  SpO2:  [90 %-98 %] 91 %  BP: (111-154)/(68-78) 111/70     Weight: 86.2 kg (190 lb 0.6 oz)  Body mass index is 32.62 kg/m².     Physical Exam  Constitutional:       General: She is not in acute distress.     Appearance: Normal appearance. She is obese.   HENT:      Head: Normocephalic and atraumatic.      Right Ear: Tympanic membrane normal. There is no impacted cerumen.      Left Ear: Tympanic membrane normal.      Nose: Nose normal.      Mouth/Throat:      Mouth: Mucous membranes are moist.      Pharynx: Oropharynx is clear.   Eyes:      Extraocular Movements: Extraocular movements intact.      Conjunctiva/sclera: Conjunctivae normal.      Pupils: Pupils are equal, round, and reactive to light.   Cardiovascular:      Rate and Rhythm: Normal rate and regular rhythm.      Pulses: Normal pulses.      Heart sounds: Normal heart sounds. No murmur heard.     No gallop.   Pulmonary:      Effort: Pulmonary effort is normal. No respiratory distress.      Comments: He has decreased breath sounds throughout.  With diffuse expiratory wheeze.  Some course rales  Abdominal:      General: Abdomen is flat. Bowel sounds are normal. There is no distension.      Palpations: Abdomen is soft.      Tenderness: There is no abdominal tenderness.   Musculoskeletal:         General: Normal range of motion.      Cervical back: Normal range of motion  and neck supple.   Skin:     General: Skin is warm and dry.      Capillary Refill: Capillary refill takes less than 2 seconds.      Coloration: Skin is not jaundiced.   Neurological:      General: No focal deficit present.      Mental Status: She is alert and oriented to person, place, and time. Mental status is at baseline.   Psychiatric:         Mood and Affect: Mood normal.         Behavior: Behavior normal.         Thought Content: Thought content normal.         Judgment: Judgment normal.              CRANIAL NERVES     CN III, IV, VI   Pupils are equal, round, and reactive to light.       Significant Labs: All pertinent labs within the past 24 hours have been reviewed.    Significant Imaging: I have reviewed all pertinent imaging results/findings within the past 24 hours.

## 2024-12-25 NOTE — ASSESSMENT & PLAN NOTE
Patient has a diagnosis of pneumonia. The cause of the pneumonia is suspected to be bacterial in etiology but organism is not known. The pneumonia is stable. The patient has the following signs/symptoms of pneumonia: cough, sputum production, and shortness of breath. The patient doeshave a current oxygen requirement and the patient does not have a home oxygen requirement. I have reviewed the pertinent imaging. The following cultures have been collected: Blood cultures and Sputum culture The culture results are listed below.     Current antimicrobial regimen consists of the antibiotics listed below. Will monitor patient closely and continue current treatment plan unchanged.    Antibiotics (From admission, onward)      Start     Stop Route Frequency Ordered    12/23/24 1715  cefTRIAXone injection 1 g         -- IV Every 24 hours (non-standard times) 12/23/24 1625    12/23/24 1715  azithromycin (ZITHROMAX) 500 mg in 0.9% NaCl 250 mL IVPB (admixture device)         -- IV Every 24 hours (non-standard times) 12/23/24 1625            Microbiology Results (last 7 days)       Procedure Component Value Units Date/Time    Respiratory Culture [[id number]] Collected: 12/23/24 1706    Order Status: Completed Specimen: Sputum, Expectorated Updated: 12/25/24 0753     Respiratory Culture Normal respiratory rosalind     GRAM STAIN Quality 3+      Many Gram Negative Rods      Many Gram positive cocci    Blood Culture [[id number]] Collected: 12/23/24 1645    Order Status: Resulted Specimen: Blood from Antecubital, Right Updated: 12/23/24 1708    Blood Culture [[id number]] Collected: 12/23/24 1640    Order Status: Resulted Specimen: Blood from Arm, Left Updated: 12/23/24 1650        I added tamiflu yesterday for flu coverage

## 2024-12-26 LAB
ALBUMIN SERPL-MCNC: 2.8 G/DL (ref 3.4–4.8)
ALBUMIN/GLOB SERPL: 0.6 RATIO (ref 1.1–2)
ALP SERPL-CCNC: 62 UNIT/L (ref 40–150)
ALT SERPL-CCNC: 15 UNIT/L (ref 0–55)
ANION GAP SERPL CALC-SCNC: 8 MEQ/L
AST SERPL-CCNC: 16 UNIT/L (ref 5–34)
B-LACTAMASE USUAL SUSC ISLT: POSITIVE
BACTERIA SPEC CULT: ABNORMAL
BASOPHILS # BLD AUTO: 0.03 X10(3)/MCL
BASOPHILS NFR BLD AUTO: 0.4 %
BILIRUB SERPL-MCNC: 0.2 MG/DL
BUN SERPL-MCNC: 33 MG/DL (ref 9.8–20.1)
CALCIUM SERPL-MCNC: 8.4 MG/DL (ref 8.4–10.2)
CHLORIDE SERPL-SCNC: 102 MMOL/L (ref 98–107)
CO2 SERPL-SCNC: 31 MMOL/L (ref 23–31)
CREAT SERPL-MCNC: 1.02 MG/DL (ref 0.55–1.02)
CREAT/UREA NIT SERPL: 32
EOSINOPHIL # BLD AUTO: 0 X10(3)/MCL (ref 0–0.9)
EOSINOPHIL NFR BLD AUTO: 0 %
ERYTHROCYTE [DISTWIDTH] IN BLOOD BY AUTOMATED COUNT: 15.4 % (ref 11.5–17)
GFR SERPLBLD CREATININE-BSD FMLA CKD-EPI: 57 ML/MIN/1.73/M2
GLOBULIN SER-MCNC: 4.4 GM/DL (ref 2.4–3.5)
GLUCOSE SERPL-MCNC: 148 MG/DL (ref 82–115)
GRAM STN SPEC: ABNORMAL
HCT VFR BLD AUTO: 38.5 % (ref 37–47)
HGB BLD-MCNC: 12.8 G/DL (ref 12–16)
IMM GRANULOCYTES # BLD AUTO: 0.08 X10(3)/MCL (ref 0–0.04)
IMM GRANULOCYTES NFR BLD AUTO: 1.1 %
LYMPHOCYTES # BLD AUTO: 1.79 X10(3)/MCL (ref 0.6–4.6)
LYMPHOCYTES NFR BLD AUTO: 25.6 %
MAGNESIUM SERPL-MCNC: 2.4 MG/DL (ref 1.6–2.6)
MCH RBC QN AUTO: 31.4 PG (ref 27–31)
MCHC RBC AUTO-ENTMCNC: 33.2 G/DL (ref 33–36)
MCV RBC AUTO: 94.4 FL (ref 80–94)
MONOCYTES # BLD AUTO: 0.24 X10(3)/MCL (ref 0.1–1.3)
MONOCYTES NFR BLD AUTO: 3.4 %
NEUTROPHILS # BLD AUTO: 4.86 X10(3)/MCL (ref 2.1–9.2)
NEUTROPHILS NFR BLD AUTO: 69.5 %
PLATELET # BLD AUTO: 316 X10(3)/MCL (ref 130–400)
PMV BLD AUTO: 9.6 FL (ref 7.4–10.4)
POTASSIUM SERPL-SCNC: 3.9 MMOL/L (ref 3.5–5.1)
PROT SERPL-MCNC: 7.2 GM/DL (ref 5.8–7.6)
RBC # BLD AUTO: 4.08 X10(6)/MCL (ref 4.2–5.4)
SODIUM SERPL-SCNC: 141 MMOL/L (ref 136–145)
WBC # BLD AUTO: 7 X10(3)/MCL (ref 4.5–11.5)

## 2024-12-26 PROCEDURE — 85025 COMPLETE CBC W/AUTO DIFF WBC: CPT | Performed by: FAMILY MEDICINE

## 2024-12-26 PROCEDURE — 99900035 HC TECH TIME PER 15 MIN (STAT)

## 2024-12-26 PROCEDURE — 36415 COLL VENOUS BLD VENIPUNCTURE: CPT | Performed by: FAMILY MEDICINE

## 2024-12-26 PROCEDURE — 80053 COMPREHEN METABOLIC PANEL: CPT | Performed by: FAMILY MEDICINE

## 2024-12-26 PROCEDURE — 25000003 PHARM REV CODE 250: Performed by: FAMILY MEDICINE

## 2024-12-26 PROCEDURE — 27000221 HC OXYGEN, UP TO 24 HOURS

## 2024-12-26 PROCEDURE — 94640 AIRWAY INHALATION TREATMENT: CPT

## 2024-12-26 PROCEDURE — 11000001 HC ACUTE MED/SURG PRIVATE ROOM

## 2024-12-26 PROCEDURE — 83735 ASSAY OF MAGNESIUM: CPT | Performed by: FAMILY MEDICINE

## 2024-12-26 PROCEDURE — 27000207 HC ISOLATION

## 2024-12-26 PROCEDURE — 94761 N-INVAS EAR/PLS OXIMETRY MLT: CPT

## 2024-12-26 PROCEDURE — 94799 UNLISTED PULMONARY SVC/PX: CPT

## 2024-12-26 PROCEDURE — 63600175 PHARM REV CODE 636 W HCPCS: Performed by: FAMILY MEDICINE

## 2024-12-26 PROCEDURE — 25000242 PHARM REV CODE 250 ALT 637 W/ HCPCS: Performed by: FAMILY MEDICINE

## 2024-12-26 RX ADMIN — QUETIAPINE FUMARATE 400 MG: 100 TABLET ORAL at 10:12

## 2024-12-26 RX ADMIN — LORAZEPAM 2 MG: 1 TABLET ORAL at 03:12

## 2024-12-26 RX ADMIN — CEFTRIAXONE SODIUM 1 G: 1 INJECTION, POWDER, FOR SOLUTION INTRAMUSCULAR; INTRAVENOUS at 05:12

## 2024-12-26 RX ADMIN — LORAZEPAM 2 MG: 1 TABLET ORAL at 09:12

## 2024-12-26 RX ADMIN — IPRATROPIUM BROMIDE AND ALBUTEROL SULFATE 3 ML: .5; 3 SOLUTION RESPIRATORY (INHALATION) at 08:12

## 2024-12-26 RX ADMIN — MICONAZOLE NITRATE: 20 POWDER TOPICAL at 09:12

## 2024-12-26 RX ADMIN — MICONAZOLE NITRATE: 20 POWDER TOPICAL at 10:12

## 2024-12-26 RX ADMIN — OSELTAMIVIR PHOSPHATE 75 MG: 75 CAPSULE ORAL at 09:12

## 2024-12-26 RX ADMIN — OSELTAMIVIR PHOSPHATE 75 MG: 75 CAPSULE ORAL at 10:12

## 2024-12-26 RX ADMIN — LEVOTHYROXINE SODIUM 100 MCG: 0.1 TABLET ORAL at 09:12

## 2024-12-26 RX ADMIN — IPRATROPIUM BROMIDE AND ALBUTEROL SULFATE 3 ML: .5; 3 SOLUTION RESPIRATORY (INHALATION) at 03:12

## 2024-12-26 RX ADMIN — LISINOPRIL 20 MG: 20 TABLET ORAL at 09:12

## 2024-12-26 RX ADMIN — LORAZEPAM 2 MG: 1 TABLET ORAL at 10:12

## 2024-12-26 RX ADMIN — WARFARIN SODIUM 5 MG: 5 TABLET ORAL at 05:12

## 2024-12-26 RX ADMIN — GUAIFENESIN AND CODEINE PHOSPHATE 5 ML: 100; 10 SOLUTION ORAL at 10:12

## 2024-12-26 RX ADMIN — SERTRALINE HYDROCHLORIDE 100 MG: 50 TABLET ORAL at 09:12

## 2024-12-26 RX ADMIN — METHYLPREDNISOLONE SODIUM SUCCINATE 80 MG: 40 INJECTION, POWDER, FOR SOLUTION INTRAMUSCULAR; INTRAVENOUS at 10:12

## 2024-12-26 RX ADMIN — IPRATROPIUM BROMIDE AND ALBUTEROL SULFATE 3 ML: .5; 3 SOLUTION RESPIRATORY (INHALATION) at 07:12

## 2024-12-26 RX ADMIN — AZITHROMYCIN MONOHYDRATE 500 MG: 500 INJECTION, POWDER, LYOPHILIZED, FOR SOLUTION INTRAVENOUS at 05:12

## 2024-12-26 RX ADMIN — IPRATROPIUM BROMIDE AND ALBUTEROL SULFATE 3 ML: .5; 3 SOLUTION RESPIRATORY (INHALATION) at 10:12

## 2024-12-26 RX ADMIN — METHYLPREDNISOLONE SODIUM SUCCINATE 80 MG: 40 INJECTION, POWDER, FOR SOLUTION INTRAMUSCULAR; INTRAVENOUS at 09:12

## 2024-12-26 RX ADMIN — IPRATROPIUM BROMIDE AND ALBUTEROL SULFATE 3 ML: .5; 3 SOLUTION RESPIRATORY (INHALATION) at 12:12

## 2024-12-26 NOTE — ASSESSMENT & PLAN NOTE
Patient's blood pressure range in the last 24 hours was: BP  Min: 114/61  Max: 157/78.The patient's inpatient anti-hypertensive regimen is listed below:  Current Antihypertensives  lisinopriL tablet 20 mg, Daily, Oral    Plan  - BP is controlled, no changes needed to their regimen  - continue home medicines as written  Lasix 20 mg iv this am  One elevated pressure this morning the rest of the measurements are controlled, continue meds as currently written and closely monitor

## 2024-12-26 NOTE — SUBJECTIVE & OBJECTIVE
Interval History: 1 day    Review of Systems   Constitutional: Negative.    HENT: Negative.     Eyes: Negative.    Respiratory:  Positive for cough and shortness of breath.    Cardiovascular: Negative.    Gastrointestinal: Negative.    Endocrine: Negative.    Genitourinary: Negative.    Musculoskeletal: Negative.    Skin: Negative.    Neurological: Negative.    Hematological: Negative.    Psychiatric/Behavioral: Negative.       Objective:     Vital Signs (Most Recent):  Temp: 97.5 °F (36.4 °C) (12/25/24 2251)  Pulse: 87 (12/26/24 0702)  Resp: 20 (12/26/24 0702)  BP: (!) 137/91 (12/26/24 0455)  SpO2: (!) 91 % (12/26/24 0702) Vital Signs (24h Range):  Temp:  [97.5 °F (36.4 °C)-98.8 °F (37.1 °C)] 97.5 °F (36.4 °C)  Pulse:  [] 87  Resp:  [18-22] 20  SpO2:  [90 %-96 %] 91 %  BP: (114-157)/(61-91) 137/91     Weight: 86.2 kg (190 lb 0.6 oz)  Body mass index is 32.62 kg/m².    Intake/Output Summary (Last 24 hours) at 12/26/2024 0734  Last data filed at 12/26/2024 0615  Gross per 24 hour   Intake 836.66 ml   Output 750 ml   Net 86.66 ml         Physical Exam  Constitutional:       General: She is not in acute distress.     Appearance: Normal appearance. She is obese.   HENT:      Head: Normocephalic and atraumatic.      Nose: Nose normal.      Mouth/Throat:      Mouth: Mucous membranes are moist.      Pharynx: Oropharynx is clear.   Eyes:      Conjunctiva/sclera: Conjunctivae normal.      Pupils: Pupils are equal, round, and reactive to light.   Cardiovascular:      Rate and Rhythm: Normal rate and regular rhythm.      Pulses: Normal pulses.      Heart sounds: Normal heart sounds. No murmur heard.  Pulmonary:      Effort: Pulmonary effort is normal.      Comments: Diffuse rhonchi and wheezing is decreased, better air move ment.   No rales  Abdominal:      General: Abdomen is flat. Bowel sounds are normal.      Palpations: Abdomen is soft.   Musculoskeletal:         General: Normal range of motion.      Cervical back:  Normal range of motion and neck supple.   Skin:     General: Skin is warm and dry.      Capillary Refill: Capillary refill takes less than 2 seconds.   Neurological:      General: No focal deficit present.      Mental Status: She is alert and oriented to person, place, and time. Mental status is at baseline.   Psychiatric:         Mood and Affect: Mood normal.         Behavior: Behavior normal.         Thought Content: Thought content normal.         Judgment: Judgment normal.             Significant Labs: All pertinent labs within the past 24 hours have been reviewed.  CBC:   Recent Labs   Lab 12/25/24 0431 12/26/24 0458   WBC 8.29 7.00   HGB 12.9 12.8   HCT 40.3 38.5    316     CMP:   Recent Labs   Lab 12/25/24 0431 12/26/24 0458    141   K 3.3* 3.9    102   CO2 30 31   BUN 25.0* 33.0*   CREATININE 1.45* 1.02   CALCIUM 8.6 8.4   ALBUMIN  --  2.8*   BILITOT  --  0.2   ALKPHOS  --  62   AST  --  16   ALT  --  15       Significant Imaging: I have reviewed all pertinent imaging results/findings within the past 24 hours.  CXR: I have reviewed all pertinent results/findings within the past 24 hours and my personal findings are:  improved with coarse interstitial markings

## 2024-12-26 NOTE — PROGRESS NOTES
[facility] - Medical Surgical Unit  Hospital Medicine  Progress Note    Patient Name: Mary Aguayo  MRN: [id number]  Patient Class: IP- Inpatient   Admission Date: 12/23/2024  Length of Stay: 3 days  Attending Physician: Reynold Pina MD  Primary Care Provider: Reynold Pina MD        Subjective     Principal Problem:Hypoxemia        HPI:  The patient is a 74-year-old  female known to me from clinic.  Presented today with a one-week history of shortness a breath cough with fever of 101.  States she and her  were both ill since last Tuesday.  States began with nasal congestion has since progressed to cough with dyspnea on exertion shortness for breath and wheezing.  Her cough is productive of yellow to green sputum.  She has not had chest pain is not had  hemoptysis.  Her respiration is occasionally painful.      States she has had a decrease in activity has general malaise and myalgia.  No noted rashes.    Her shortness breath is controlled currently with nasal cannula oxygen.  On admission had in our office her sats were in the mid to upper 80s on room air.  After exertion she was in the lower 80s.    Overview/Hospital Course:  She is feeling better today.  Still requiring supplemental oxygen but improving.  left lower lobe pneumonia on chest x-ray is clearing.  Likely progression was from a flu initial illness with a secondary bacterial pneumonia.  She is currently on Rocephin and azithromycin and has had a resolution of her leukocytosis.  She has remained afebrile.  States that shortness breath is slightly improved. she is little stronger but still weak relative to her baseline.    Still with mild wheezing   flu is positive  Denies chest pain or palpitations at this time.    Creatinine and potassium are wnl      Interval History: 1 day    Review of Systems   Constitutional: Negative.    HENT: Negative.     Eyes: Negative.    Respiratory:  Positive for cough and  shortness of breath.    Cardiovascular: Negative.    Gastrointestinal: Negative.    Endocrine: Negative.    Genitourinary: Negative.    Musculoskeletal: Negative.    Skin: Negative.    Neurological: Negative.    Hematological: Negative.    Psychiatric/Behavioral: Negative.       Objective:     Vital Signs (Most Recent):  Temp: 97.5 °F (36.4 °C) (12/25/24 2251)  Pulse: 87 (12/26/24 0702)  Resp: 20 (12/26/24 0702)  BP: (!) 137/91 (12/26/24 0455)  SpO2: (!) 91 % (12/26/24 0702) Vital Signs (24h Range):  Temp:  [97.5 °F (36.4 °C)-98.8 °F (37.1 °C)] 97.5 °F (36.4 °C)  Pulse:  [] 87  Resp:  [18-22] 20  SpO2:  [90 %-96 %] 91 %  BP: (114-157)/(61-91) 137/91     Weight: 86.2 kg (190 lb 0.6 oz)  Body mass index is 32.62 kg/m².    Intake/Output Summary (Last 24 hours) at 12/26/2024 0734  Last data filed at 12/26/2024 0615  Gross per 24 hour   Intake 836.66 ml   Output 750 ml   Net 86.66 ml         Physical Exam  Constitutional:       General: She is not in acute distress.     Appearance: Normal appearance. She is obese.   HENT:      Head: Normocephalic and atraumatic.      Nose: Nose normal.      Mouth/Throat:      Mouth: Mucous membranes are moist.      Pharynx: Oropharynx is clear.   Eyes:      Conjunctiva/sclera: Conjunctivae normal.      Pupils: Pupils are equal, round, and reactive to light.   Cardiovascular:      Rate and Rhythm: Normal rate and regular rhythm.      Pulses: Normal pulses.      Heart sounds: Normal heart sounds. No murmur heard.  Pulmonary:      Effort: Pulmonary effort is normal.      Comments: Diffuse rhonchi and wheezing is decreased, better air move ment.   No rales  Abdominal:      General: Abdomen is flat. Bowel sounds are normal.      Palpations: Abdomen is soft.   Musculoskeletal:         General: Normal range of motion.      Cervical back: Normal range of motion and neck supple.   Skin:     General: Skin is warm and dry.      Capillary Refill: Capillary refill takes less than 2 seconds.    Neurological:      General: No focal deficit present.      Mental Status: She is alert and oriented to person, place, and time. Mental status is at baseline.   Psychiatric:         Mood and Affect: Mood normal.         Behavior: Behavior normal.         Thought Content: Thought content normal.         Judgment: Judgment normal.             Significant Labs: All pertinent labs within the past 24 hours have been reviewed.  CBC:   Recent Labs   Lab 12/25/24 0431 12/26/24  0458   WBC 8.29 7.00   HGB 12.9 12.8   HCT 40.3 38.5    316     CMP:   Recent Labs   Lab 12/25/24 0431 12/26/24  0458    141   K 3.3* 3.9    102   CO2 30 31   BUN 25.0* 33.0*   CREATININE 1.45* 1.02   CALCIUM 8.6 8.4   ALBUMIN  --  2.8*   BILITOT  --  0.2   ALKPHOS  --  62   AST  --  16   ALT  --  15       Significant Imaging: I have reviewed all pertinent imaging results/findings within the past 24 hours.  CXR: I have reviewed all pertinent results/findings within the past 24 hours and my personal findings are:  improved with coarse interstitial markings    Assessment and Plan     * Hypoxemia     there is an underlying pneumonia.  She has  wheezing and decreased air movement.    follow blood culture sputum culture.    support with nasal cannula oxygen per respiratory protocol.  We will cover with Rocephin and azithromycin IV as well as albuterol nebs 4 times a day and q.6 PRN.    Have gotten a respiratory PCR and we will follow results     This may represent exacerbation of her asthma as well.     Influenza A  Continue tamiflu      Pneumonia of left lower lobe due to infectious organism  Patient has a diagnosis of pneumonia. The cause of the pneumonia is suspected to be bacterial in etiology but organism is not known. The pneumonia is stable. The patient has the following signs/symptoms of pneumonia: cough, sputum production, and shortness of breath. The patient doeshave a current oxygen requirement and the patient does not  have a home oxygen requirement. I have reviewed the pertinent imaging. The following cultures have been collected: Blood cultures and Sputum culture The culture results are listed below.     Current antimicrobial regimen consists of the antibiotics listed below. Will monitor patient closely and continue current treatment plan unchanged.    Antibiotics (From admission, onward)      Start     Stop Route Frequency Ordered    12/23/24 1715  cefTRIAXone injection 1 g         -- IV Every 24 hours (non-standard times) 12/23/24 1625    12/23/24 1715  azithromycin (ZITHROMAX) 500 mg in 0.9% NaCl 250 mL IVPB (admixture device)         -- IV Every 24 hours (non-standard times) 12/23/24 1625            Microbiology Results (last 7 days)       Procedure Component Value Units Date/Time    Blood Culture [[id number]]  (Normal) Collected: 12/23/24 1640    Order Status: Completed Specimen: Blood from Arm, Left Updated: 12/25/24 0902     Blood Culture No Growth At 24 Hours    Blood Culture [[id number]]  (Normal) Collected: 12/23/24 1645    Order Status: Completed Specimen: Blood from Antecubital, Right Updated: 12/25/24 0902     Blood Culture No Growth At 24 Hours    Respiratory Culture [[id number]] Collected: 12/23/24 1706    Order Status: Completed Specimen: Sputum, Expectorated Updated: 12/25/24 0753     Respiratory Culture Normal respiratory rosalind     GRAM STAIN Quality 3+      Many Gram Negative Rods      Many Gram positive cocci        I added tamiflu  for flu coverage.  Is improving.  Still with oxygen requirement.   Will continue care and monitor sats     Moderate persistent asthma with acute exacerbation  We will continue with nebulizations and antibiotics as above.  iv solumedrol 80 mg q-12    Obesity  Body mass index is 32.62 kg/m². Morbid obesity complicates all aspects of disease management from diagnostic modalities to treatment. Weight loss encouraged and health benefits explained to  patient.         Hypertension  Patient's blood pressure range in the last 24 hours was: BP  Min: 114/61  Max: 157/78.The patient's inpatient anti-hypertensive regimen is listed below:  Current Antihypertensives  lisinopriL tablet 20 mg, Daily, Oral    Plan  - BP is controlled, no changes needed to their regimen  - continue home medicines as written  Lasix 20 mg iv this am  One elevated pressure this morning the rest of the measurements are controlled, continue meds as currently written and closely monitor    Generalized anxiety disorder  We will continue her home sertraline and PRN anxiety medications        VTE Risk Mitigation (From admission, onward)           Ordered     warfarin (COUMADIN) tablet 5 mg  Daily         12/23/24 1625     IP VTE HIGH RISK PATIENT  Once         12/23/24 1625     Reason for No Pharmacological VTE Prophylaxis  Once        Question:  Reasons:  Answer:  Already adequately anticoagulated on oral Anticoagulants    12/23/24 1625                    Discharge Planning   MARLO:      Code Status: Full Code   Medical Readiness for Discharge Date:   Discharge Plan A: Home                        Reynold Pina MD  Department of Hospital Medicine   [facility] - [facility]

## 2024-12-26 NOTE — PLAN OF CARE
Problem: Adult Inpatient Plan of Care  Goal: Plan of Care Review  Outcome: Progressing  Goal: Patient-Specific Goal (Individualized)  Outcome: Progressing  Goal: Absence of Hospital-Acquired Illness or Injury  Outcome: Progressing  Goal: Optimal Comfort and Wellbeing  Outcome: Progressing  Goal: Readiness for Transition of Care  Outcome: Progressing     Problem: Wound  Goal: Optimal Coping  Outcome: Progressing  Goal: Optimal Functional Ability  Outcome: Progressing  Goal: Absence of Infection Signs and Symptoms  Outcome: Progressing  Goal: Improved Oral Intake  Outcome: Progressing  Goal: Optimal Pain Control and Function  Outcome: Progressing  Goal: Skin Health and Integrity  Outcome: Progressing  Goal: Optimal Wound Healing  Outcome: Progressing     Problem: Pneumonia  Goal: Fluid Balance  Outcome: Progressing  Goal: Resolution of Infection Signs and Symptoms  Outcome: Progressing  Goal: Effective Oxygenation and Ventilation  Outcome: Progressing     Problem: Gas Exchange Impaired  Goal: Optimal Gas Exchange  Outcome: Progressing

## 2024-12-26 NOTE — PLAN OF CARE
Problem: Adult Inpatient Plan of Care  Goal: Plan of Care Review  Outcome: Progressing  Goal: Patient-Specific Goal (Individualized)  Outcome: Progressing  Goal: Absence of Hospital-Acquired Illness or Injury  Outcome: Progressing  Goal: Optimal Comfort and Wellbeing  Outcome: Progressing  Goal: Readiness for Transition of Care  Outcome: Progressing     Problem: Wound  Goal: Optimal Coping  Outcome: Progressing  Goal: Optimal Functional Ability  Outcome: Progressing  Goal: Absence of Infection Signs and Symptoms  Outcome: Progressing  Goal: Improved Oral Intake  Outcome: Progressing  Goal: Optimal Pain Control and Function  Outcome: Progressing  Goal: Skin Health and Integrity  Outcome: Progressing  Goal: Optimal Wound Healing  Outcome: Progressing     Problem: Pneumonia  Goal: Fluid Balance  Outcome: Progressing  Goal: Resolution of Infection Signs and Symptoms  Outcome: Progressing  Goal: Effective Oxygenation and Ventilation  Outcome: Progressing     Problem: Gas Exchange Impaired  Goal: Optimal Gas Exchange  Outcome: Progressing     Problem: Infection  Goal: Absence of Infection Signs and Symptoms  Outcome: Progressing     Problem: Fatigue  Goal: Improved Activity Tolerance  Outcome: Progressing     Problem: Social Isolation  Goal: Social Connection Supported  Outcome: Progressing

## 2024-12-26 NOTE — ASSESSMENT & PLAN NOTE
Patient has a diagnosis of pneumonia. The cause of the pneumonia is suspected to be bacterial in etiology but organism is not known. The pneumonia is stable. The patient has the following signs/symptoms of pneumonia: cough, sputum production, and shortness of breath. The patient doeshave a current oxygen requirement and the patient does not have a home oxygen requirement. I have reviewed the pertinent imaging. The following cultures have been collected: Blood cultures and Sputum culture The culture results are listed below.     Current antimicrobial regimen consists of the antibiotics listed below. Will monitor patient closely and continue current treatment plan unchanged.    Antibiotics (From admission, onward)      Start     Stop Route Frequency Ordered    12/23/24 1715  cefTRIAXone injection 1 g         -- IV Every 24 hours (non-standard times) 12/23/24 1625    12/23/24 1715  azithromycin (ZITHROMAX) 500 mg in 0.9% NaCl 250 mL IVPB (admixture device)         -- IV Every 24 hours (non-standard times) 12/23/24 1625            Microbiology Results (last 7 days)       Procedure Component Value Units Date/Time    Blood Culture [[id number]]  (Normal) Collected: 12/23/24 1640    Order Status: Completed Specimen: Blood from Arm, Left Updated: 12/25/24 0902     Blood Culture No Growth At 24 Hours    Blood Culture [[id number]]  (Normal) Collected: 12/23/24 1645    Order Status: Completed Specimen: Blood from Antecubital, Right Updated: 12/25/24 0902     Blood Culture No Growth At 24 Hours    Respiratory Culture [[id number]] Collected: 12/23/24 1706    Order Status: Completed Specimen: Sputum, Expectorated Updated: 12/25/24 0753     Respiratory Culture Normal respiratory rosalind     GRAM STAIN Quality 3+      Many Gram Negative Rods      Many Gram positive cocci        I added tamiflu  for flu coverage.  Is improving.  Still with oxygen requirement.   Will continue care and monitor sats

## 2024-12-27 LAB
INR PPP: 1.5
PROTHROMBIN TIME: 19.2 SECONDS (ref 12.5–14.5)

## 2024-12-27 PROCEDURE — 63600175 PHARM REV CODE 636 W HCPCS: Performed by: FAMILY MEDICINE

## 2024-12-27 PROCEDURE — 25000242 PHARM REV CODE 250 ALT 637 W/ HCPCS: Performed by: FAMILY MEDICINE

## 2024-12-27 PROCEDURE — 94761 N-INVAS EAR/PLS OXIMETRY MLT: CPT

## 2024-12-27 PROCEDURE — 25000003 PHARM REV CODE 250: Performed by: FAMILY MEDICINE

## 2024-12-27 PROCEDURE — 27000221 HC OXYGEN, UP TO 24 HOURS

## 2024-12-27 PROCEDURE — 94640 AIRWAY INHALATION TREATMENT: CPT

## 2024-12-27 PROCEDURE — 99900035 HC TECH TIME PER 15 MIN (STAT)

## 2024-12-27 PROCEDURE — 36415 COLL VENOUS BLD VENIPUNCTURE: CPT | Performed by: FAMILY MEDICINE

## 2024-12-27 PROCEDURE — 27000207 HC ISOLATION

## 2024-12-27 PROCEDURE — 11000001 HC ACUTE MED/SURG PRIVATE ROOM

## 2024-12-27 PROCEDURE — 85610 PROTHROMBIN TIME: CPT | Performed by: FAMILY MEDICINE

## 2024-12-27 RX ORDER — OSELTAMIVIR PHOSPHATE 30 MG/1
30 CAPSULE ORAL 2 TIMES DAILY
Status: COMPLETED | OUTPATIENT
Start: 2024-12-27 | End: 2024-12-29

## 2024-12-27 RX ADMIN — IPRATROPIUM BROMIDE AND ALBUTEROL SULFATE 3 ML: .5; 3 SOLUTION RESPIRATORY (INHALATION) at 07:12

## 2024-12-27 RX ADMIN — IPRATROPIUM BROMIDE AND ALBUTEROL SULFATE 3 ML: .5; 3 SOLUTION RESPIRATORY (INHALATION) at 03:12

## 2024-12-27 RX ADMIN — MICONAZOLE NITRATE: 20 POWDER TOPICAL at 08:12

## 2024-12-27 RX ADMIN — SERTRALINE HYDROCHLORIDE 100 MG: 50 TABLET ORAL at 08:12

## 2024-12-27 RX ADMIN — OSELTAMIVIR PHOSPHATE 75 MG: 75 CAPSULE ORAL at 08:12

## 2024-12-27 RX ADMIN — LORAZEPAM 2 MG: 1 TABLET ORAL at 08:12

## 2024-12-27 RX ADMIN — ACETAMINOPHEN 650 MG: 325 TABLET, FILM COATED ORAL at 09:12

## 2024-12-27 RX ADMIN — GUAIFENESIN AND CODEINE PHOSPHATE 5 ML: 100; 10 SOLUTION ORAL at 09:12

## 2024-12-27 RX ADMIN — METHYLPREDNISOLONE SODIUM SUCCINATE 80 MG: 40 INJECTION, POWDER, FOR SOLUTION INTRAMUSCULAR; INTRAVENOUS at 09:12

## 2024-12-27 RX ADMIN — OSELTAMIVIR PHOSPHATE 30 MG: 30 CAPSULE ORAL at 08:12

## 2024-12-27 RX ADMIN — WARFARIN SODIUM 5 MG: 5 TABLET ORAL at 04:12

## 2024-12-27 RX ADMIN — IPRATROPIUM BROMIDE AND ALBUTEROL SULFATE 3 ML: .5; 3 SOLUTION RESPIRATORY (INHALATION) at 11:12

## 2024-12-27 RX ADMIN — METHYLPREDNISOLONE SODIUM SUCCINATE 80 MG: 40 INJECTION, POWDER, FOR SOLUTION INTRAMUSCULAR; INTRAVENOUS at 08:12

## 2024-12-27 RX ADMIN — LORAZEPAM 2 MG: 1 TABLET ORAL at 02:12

## 2024-12-27 RX ADMIN — QUETIAPINE FUMARATE 400 MG: 100 TABLET ORAL at 08:12

## 2024-12-27 RX ADMIN — LISINOPRIL 20 MG: 20 TABLET ORAL at 08:12

## 2024-12-27 RX ADMIN — CEFTRIAXONE SODIUM 1 G: 1 INJECTION, POWDER, FOR SOLUTION INTRAMUSCULAR; INTRAVENOUS at 04:12

## 2024-12-27 RX ADMIN — AZITHROMYCIN MONOHYDRATE 500 MG: 500 INJECTION, POWDER, LYOPHILIZED, FOR SOLUTION INTRAVENOUS at 04:12

## 2024-12-27 RX ADMIN — LEVOTHYROXINE SODIUM 100 MCG: 0.1 TABLET ORAL at 08:12

## 2024-12-27 NOTE — PROGRESS NOTES
"Inpatient Nutrition Evaluation    Admit Date: 12/23/2024   Total duration of encounter: 4 days    Nutrition Recommendation/Prescription     Recommend Heart Healthy Diet as tolerated.   Monitor intake, weight, and labs.       RD following and available as needed. Thank you.     Nutrition Assessment     Chart Review    Reason Seen: continuous nutrition monitoring    Malnutrition Screening Tool Results   Have you recently lost weight without trying?: No  Have you been eating poorly because of a decreased appetite?: No   MST Score: 0     Diagnosis:  Hypoxemia.     Relevant Medical History:   Arthritis      Asthma      DVT (deep venous thrombosis)      Factor V Leiden mutation      Fibromyalgia      Hypertension          Nutrition-Related Medications:   Levothyroxine; Methylprednisolone; Warfarin.     Nutrition-Related Labs:  12/27: No new labs.   12/26: BUN 33.0(H); (H); Alb 2.8(L).    Diet Order: Diet Adult Regular Isolation Tray - Styrofoam  Oral Supplement Order: none  Appetite/Oral Intake: good/% of meals  Factors Affecting Nutritional Intake: none identified  Food/[employer]/Cultural Preferences: none reported  Food Allergies:  Asprin.        Wound(s):       Comments    12/27: Pt with good appetite and intake. No recent weight loss noted/reported. Most recent labs reviewed. GLU elevated; however, pt is on Methylprednisolone. Will continue to monitor during stay.     Anthropometrics    Height: 5' 4" (162.6 cm) Height Method: Stated  Last Weight: 86.2 kg (190 lb 0.6 oz) (12/23/24 1645) Weight Method: Bed Scale  BMI (Calculated): 32.6  BMI Classification: obese grade I (BMI 30-34.9)     Ideal Body Weight (IBW), Female: 120 lb     % Ideal Body Weight, Female (lb): 158.37 %                             Usual Weight Provided By: EMR weight history    Wt Readings from Last 5 Encounters:   12/23/24 86.2 kg (190 lb 0.6 oz)   09/16/24 86.2 kg (190 lb)   12/24/23 95.1 kg (209 lb 10.5 oz)   07/23/23 81.6 kg (180 lb) "   06/10/23 90.3 kg (199 lb)     Weight Change(s) Since Admission:  Admit Weight: 86.2 kg (190 lb 0.6 oz) (12/23/24 1645)      Patient Education    Not applicable.    Monitoring & Evaluation     Dietitian will monitor food and beverage intake, energy intake, weight, weight change, electrolyte/renal panel, glucose/endocrine profile, and gastrointestinal profile.  Nutrition Risk/Follow-Up: low (follow-up in 5-7 days)  Patients assigned 'low nutrition risk' status do not qualify for a full nutritional assessment but will be monitored and re-evaluated in a 5-7 day time period. Please consult if re-evaluation needed sooner.

## 2024-12-27 NOTE — PLAN OF CARE
Problem: Adult Inpatient Plan of Care  Goal: Plan of Care Review  Outcome: Progressing  Goal: Patient-Specific Goal (Individualized)  Outcome: Progressing  Goal: Absence of Hospital-Acquired Illness or Injury  Outcome: Progressing  Goal: Optimal Comfort and Wellbeing  Outcome: Progressing  Goal: Readiness for Transition of Care  Outcome: Progressing     Problem: Wound  Goal: Optimal Coping  Outcome: Progressing  Goal: Optimal Functional Ability  Outcome: Progressing  Goal: Absence of Infection Signs and Symptoms  Outcome: Progressing  Goal: Improved Oral Intake  Outcome: Progressing  Goal: Optimal Pain Control and Function  Outcome: Progressing  Goal: Skin Health and Integrity  Outcome: Progressing  Goal: Optimal Wound Healing  Outcome: Progressing     Problem: Pneumonia  Goal: Fluid Balance  Outcome: Progressing  Goal: Resolution of Infection Signs and Symptoms  Outcome: Progressing  Goal: Effective Oxygenation and Ventilation  Outcome: Progressing     Problem: Gas Exchange Impaired  Goal: Optimal Gas Exchange  Outcome: Progressing     Problem: Infection  Goal: Absence of Infection Signs and Symptoms  Outcome: Progressing     Problem: Fatigue  Goal: Improved Activity Tolerance  Outcome: Progressing     Problem: Social Isolation  Goal: Social Connection Supported  Outcome: Progressing     Problem: Comorbidity Management  Goal: Blood Pressure in Desired Range  Outcome: Progressing

## 2024-12-27 NOTE — PLAN OF CARE
Problem: Adult Inpatient Plan of Care  Goal: Plan of Care Review  Outcome: Progressing  Goal: Patient-Specific Goal (Individualized)  Outcome: Progressing  Goal: Absence of Hospital-Acquired Illness or Injury  Outcome: Progressing  Goal: Optimal Comfort and Wellbeing  Outcome: Progressing  Goal: Readiness for Transition of Care  Outcome: Progressing     Problem: Wound  Goal: Optimal Coping  Outcome: Progressing  Goal: Optimal Functional Ability  Outcome: Progressing  Goal: Absence of Infection Signs and Symptoms  Outcome: Progressing  Goal: Improved Oral Intake  Outcome: Progressing  Goal: Optimal Pain Control and Function  Outcome: Progressing  Goal: Skin Health and Integrity  Outcome: Progressing  Goal: Optimal Wound Healing  Outcome: Progressing     Problem: Pneumonia  Goal: Fluid Balance  Outcome: Progressing  Goal: Resolution of Infection Signs and Symptoms  Outcome: Progressing  Goal: Effective Oxygenation and Ventilation  Outcome: Progressing     Problem: Gas Exchange Impaired  Goal: Optimal Gas Exchange  Outcome: Progressing     Problem: Fatigue  Goal: Improved Activity Tolerance  Outcome: Progressing     Problem: Social Isolation  Goal: Social Connection Supported  Outcome: Progressing

## 2024-12-27 NOTE — SUBJECTIVE & OBJECTIVE
Interval History: 1 day    Review of Systems   Constitutional: Negative.    HENT: Negative.     Eyes: Negative.    Respiratory:  Positive for cough and shortness of breath.    Cardiovascular: Negative.    Gastrointestinal: Negative.    Endocrine: Negative.    Genitourinary: Negative.    Musculoskeletal: Negative.    Skin: Negative.    Neurological: Negative.    Hematological: Negative.    Psychiatric/Behavioral: Negative.       Objective:     Vital Signs (Most Recent):  Temp: 97.3 °F (36.3 °C) (12/27/24 0400)  Pulse: 92 (12/27/24 0713)  Resp: 18 (12/27/24 0713)  BP: 115/73 (12/27/24 0600)  SpO2: 98 % (12/27/24 0713) Vital Signs (24h Range):  Temp:  [97.3 °F (36.3 °C)-98.3 °F (36.8 °C)] 97.3 °F (36.3 °C)  Pulse:  [] 92  Resp:  [16-20] 18  SpO2:  [90 %-98 %] 98 %  BP: (115-170)/() 115/73     Weight: 86.2 kg (190 lb 0.6 oz)  Body mass index is 32.62 kg/m².    Intake/Output Summary (Last 24 hours) at 12/27/2024 0753  Last data filed at 12/27/2024 0735  Gross per 24 hour   Intake 1193.81 ml   Output 750 ml   Net 443.81 ml         Physical Exam  Constitutional:       General: She is not in acute distress.     Appearance: Normal appearance. She is obese.   HENT:      Head: Normocephalic and atraumatic.      Nose: Nose normal.      Mouth/Throat:      Mouth: Mucous membranes are moist.      Pharynx: Oropharynx is clear.   Eyes:      Conjunctiva/sclera: Conjunctivae normal.      Pupils: Pupils are equal, round, and reactive to light.   Cardiovascular:      Rate and Rhythm: Normal rate and regular rhythm.      Pulses: Normal pulses.      Heart sounds: Normal heart sounds. No murmur heard.  Pulmonary:      Effort: Pulmonary effort is normal.      Comments: Diffuse rhonchi and wheezing is cleared, better air move ment.   No rales  Abdominal:      General: Abdomen is flat. Bowel sounds are normal.      Palpations: Abdomen is soft.   Musculoskeletal:         General: Normal range of motion.      Cervical back: Normal  range of motion and neck supple.   Skin:     General: Skin is warm and dry.      Capillary Refill: Capillary refill takes less than 2 seconds.   Neurological:      General: No focal deficit present.      Mental Status: She is alert and oriented to person, place, and time. Mental status is at baseline.   Psychiatric:         Mood and Affect: Mood normal.         Behavior: Behavior normal.         Thought Content: Thought content normal.         Judgment: Judgment normal.             Significant Labs: All pertinent labs within the past 24 hours have been reviewed.  CBC:   Recent Labs   Lab 12/26/24  0458   WBC 7.00   HGB 12.8   HCT 38.5        CMP:   Recent Labs   Lab 12/26/24  0458      K 3.9      CO2 31   BUN 33.0*   CREATININE 1.02   CALCIUM 8.4   ALBUMIN 2.8*   BILITOT 0.2   ALKPHOS 62   AST 16   ALT 15       Significant Imaging: I have reviewed all pertinent imaging results/findings within the past 24 hours.  CXR: I have reviewed all pertinent results/findings within the past 24 hours and my personal findings are:  improved with coarse interstitial markings

## 2024-12-27 NOTE — ASSESSMENT & PLAN NOTE
Patient's blood pressure range in the last 24 hours was: BP  Min: 115/73  Max: 170/75.The patient's inpatient anti-hypertensive regimen is listed below:  Current Antihypertensives  lisinopriL tablet 20 mg, Daily, Oral    Plan  - BP is controlled, no changes needed to their regimen  - continue home medicines as written  Lasix 20 mg iv this am  One elevated pressure this morning the rest of the measurements are controlled, continue meds as currently written and closely monitor

## 2024-12-27 NOTE — ASSESSMENT & PLAN NOTE
Patient has a diagnosis of pneumonia. The cause of the pneumonia is h flu. The pneumonia is stable. The patient has the following signs/symptoms of pneumonia: cough, sputum production, and shortness of breath. The patient doeshave a current oxygen requirement and the patient does not have a home oxygen requirement. I have reviewed the pertinent imaging. The following cultures have been collected: Blood cultures and Sputum culture The culture results are listed below.     Current antimicrobial regimen consists of the antibiotics listed below. Will monitor patient closely and continue current treatment plan unchanged.    Antibiotics (From admission, onward)      Start     Stop Route Frequency Ordered    12/23/24 1715  cefTRIAXone injection 1 g         -- IV Every 24 hours (non-standard times) 12/23/24 1625    12/23/24 1715  azithromycin (ZITHROMAX) 500 mg in 0.9% NaCl 250 mL IVPB (admixture device)         -- IV Every 24 hours (non-standard times) 12/23/24 1625            Microbiology Results (last 7 days)       Procedure Component Value Units Date/Time    Respiratory Culture [[id number]]  (Abnormal) Collected: 12/23/24 1706    Order Status: Completed Specimen: Sputum, Expectorated Updated: 12/26/24 1207     Respiratory Culture Moderate Haemophilus influenzae     Comment: with normal respiratory rosalind  Haemophilus species- if positive beta lactamase resistant to ampicillin and amoxicillin , if negative sensitive to ampicillin and amoxicillin.  Amoxicillin/clavulanate, azithromycin, cefaclor and cefdinir are used as empiric therapy for respiratory tract infections due to Haemophilus. The results of susceptibility tests with these antimicrobial agents are often not necessary for management of individual patients. Ampicillin only is reported at [facility]. If further susceptibility is needed, please contact the Microbiology department at [phone].        Beta Lactamase Positive     GRAM STAIN Quality 3+      Many Gram Negative  Rods      Many Gram positive cocci    Blood Culture [[id number]]  (Normal) Collected: 12/23/24 1640    Order Status: Completed Specimen: Blood from Arm, Left Updated: 12/26/24 0901     Blood Culture No Growth At 48 Hours    Blood Culture [[id number]]  (Normal) Collected: 12/23/24 1645    Order Status: Completed Specimen: Blood from Antecubital, Right Updated: 12/26/24 0901     Blood Culture No Growth At 48 Hours        I added tamiflu  for flu coverage.  Is improving.  Still with oxygen requirement.   Will continue care and monitor sats

## 2024-12-27 NOTE — PROGRESS NOTES
Pharmacist Renal Dose Adjustment Note    Mary Aguayo is a 74 y.o. female being treated with the medication oseltamivir    Patient Data:    Vital Signs (Most Recent):  Temp: 97.4 °F (36.3 °C) (12/27/24 1144)  Pulse: 95 (12/27/24 1144)  Resp: 18 (12/27/24 1101)  BP: (!) 153/84 (12/27/24 1144)  SpO2: (!) 93 % (12/27/24 1144) Vital Signs (72h Range):  Temp:  [97.3 °F (36.3 °C)-99 °F (37.2 °C)]   Pulse:  []   Resp:  [16-22]   BP: (111-170)/()   SpO2:  [90 %-98 %]      Recent Labs   Lab 12/24/24  0451 12/25/24  0431 12/26/24  0458   CREATININE 0.81 1.45* 1.02     Serum creatinine: 1.02 mg/dL 12/26/24 0458  Estimated creatinine clearance: 50.6 mL/min    Medication:oseltamivir  dose: 75mg frequency q12h will be changed to medication:oseltamivir dose:30mg frequency:q12h    Pharmacist's Name: Rivera Macias  Pharmacist's Extension: [phone]

## 2024-12-27 NOTE — ASSESSMENT & PLAN NOTE
there is an underlying pneumonia.     support with nasal cannula oxygen per respiratory protocol.  Significant improvement today. Will wean oxygen as tolerated  We will cover with Rocephin and azithromycin IV as well as albuterol nebs 4 times a day and q.6 PRN.    Heamophilus on culture    This may represent exacerbation of her asthma as well.

## 2024-12-27 NOTE — PROGRESS NOTES
[facility] - [facility] Unit  Hospital Medicine  Progress Note    Patient Name: Mary Aguayo  MRN: [id number]  Patient Class: IP- Inpatient   Admission Date: 12/23/2024  Length of Stay: 4 days  Attending Physician: Reynold Pina MD  Primary Care Provider: Reynold Pina MD        Subjective     Principal Problem:Hypoxemia        HPI:  The patient is a 74-year-old  female known to me from clinic.  Presented today with a one-week history of shortness a breath cough with fever of 101.  States she and her  were both ill since last Tuesday.  States began with nasal congestion has since progressed to cough with dyspnea on exertion shortness for breath and wheezing.  Her cough is productive of yellow to green sputum.  She has not had chest pain is not had  hemoptysis.  Her respiration is occasionally painful.      States she has had a decrease in activity has general malaise and myalgia.  No noted rashes.    Her shortness breath is controlled currently with nasal cannula oxygen.  On admission had in our office her sats were in the mid to upper 80s on room air.  After exertion she was in the lower 80s.    Overview/Hospital Course:  She is feeling better today.  Still requiring supplemental oxygen but significant improvement.  left lower lobe pneumonia on chest x-ray is clearing.  Likely progression was from a flu initial illness with a secondary bacterial heamophilus pneumonia.  She is currently on Rocephin and azithromycin and has had a resolution of her leukocytosis.  She has remained afebrile.  States that shortness breath is  improved. she is little stronger but still weak relative to her baseline.    Wheezing resolved     Denies chest pain or palpitations at this time.    Creatinine and potassium are wnl      Interval History: 1 day    Review of Systems   Constitutional: Negative.    HENT: Negative.     Eyes: Negative.    Respiratory:  Positive for cough and  shortness of breath.    Cardiovascular: Negative.    Gastrointestinal: Negative.    Endocrine: Negative.    Genitourinary: Negative.    Musculoskeletal: Negative.    Skin: Negative.    Neurological: Negative.    Hematological: Negative.    Psychiatric/Behavioral: Negative.       Objective:     Vital Signs (Most Recent):  Temp: 97.3 °F (36.3 °C) (12/27/24 0400)  Pulse: 92 (12/27/24 0713)  Resp: 18 (12/27/24 0713)  BP: 115/73 (12/27/24 0600)  SpO2: 98 % (12/27/24 0713) Vital Signs (24h Range):  Temp:  [97.3 °F (36.3 °C)-98.3 °F (36.8 °C)] 97.3 °F (36.3 °C)  Pulse:  [] 92  Resp:  [16-20] 18  SpO2:  [90 %-98 %] 98 %  BP: (115-170)/() 115/73     Weight: 86.2 kg (190 lb 0.6 oz)  Body mass index is 32.62 kg/m².    Intake/Output Summary (Last 24 hours) at 12/27/2024 0753  Last data filed at 12/27/2024 0735  Gross per 24 hour   Intake 1193.81 ml   Output 750 ml   Net 443.81 ml         Physical Exam  Constitutional:       General: She is not in acute distress.     Appearance: Normal appearance. She is obese.   HENT:      Head: Normocephalic and atraumatic.      Nose: Nose normal.      Mouth/Throat:      Mouth: Mucous membranes are moist.      Pharynx: Oropharynx is clear.   Eyes:      Conjunctiva/sclera: Conjunctivae normal.      Pupils: Pupils are equal, round, and reactive to light.   Cardiovascular:      Rate and Rhythm: Normal rate and regular rhythm.      Pulses: Normal pulses.      Heart sounds: Normal heart sounds. No murmur heard.  Pulmonary:      Effort: Pulmonary effort is normal.      Comments: Diffuse rhonchi and wheezing is cleared, better air move ment.   No rales  Abdominal:      General: Abdomen is flat. Bowel sounds are normal.      Palpations: Abdomen is soft.   Musculoskeletal:         General: Normal range of motion.      Cervical back: Normal range of motion and neck supple.   Skin:     General: Skin is warm and dry.      Capillary Refill: Capillary refill takes less than 2 seconds.    Neurological:      General: No focal deficit present.      Mental Status: She is alert and oriented to person, place, and time. Mental status is at baseline.   Psychiatric:         Mood and Affect: Mood normal.         Behavior: Behavior normal.         Thought Content: Thought content normal.         Judgment: Judgment normal.             Significant Labs: All pertinent labs within the past 24 hours have been reviewed.  CBC:   Recent Labs   Lab 12/26/24 0458   WBC 7.00   HGB 12.8   HCT 38.5        CMP:   Recent Labs   Lab 12/26/24 0458      K 3.9      CO2 31   BUN 33.0*   CREATININE 1.02   CALCIUM 8.4   ALBUMIN 2.8*   BILITOT 0.2   ALKPHOS 62   AST 16   ALT 15       Significant Imaging: I have reviewed all pertinent imaging results/findings within the past 24 hours.  CXR: I have reviewed all pertinent results/findings within the past 24 hours and my personal findings are:  improved with coarse interstitial markings    Assessment and Plan     * Hypoxemia     there is an underlying pneumonia.     support with nasal cannula oxygen per respiratory protocol.  Significant improvement today. Will wean oxygen as tolerated  We will cover with Rocephin and azithromycin IV as well as albuterol nebs 4 times a day and q.6 PRN.    Heamophilus on culture    This may represent exacerbation of her asthma as well.     Influenza A  Continue tamiflu      Pneumonia of left lower lobe due to infectious organism  Patient has a diagnosis of pneumonia. The cause of the pneumonia is h flu. The pneumonia is stable. The patient has the following signs/symptoms of pneumonia: cough, sputum production, and shortness of breath. The patient doeshave a current oxygen requirement and the patient does not have a home oxygen requirement. I have reviewed the pertinent imaging. The following cultures have been collected: Blood cultures and Sputum culture The culture results are listed below.     Current antimicrobial regimen  consists of the antibiotics listed below. Will monitor patient closely and continue current treatment plan unchanged.    Antibiotics (From admission, onward)      Start     Stop Route Frequency Ordered    12/23/24 1715  cefTRIAXone injection 1 g         -- IV Every 24 hours (non-standard times) 12/23/24 1625    12/23/24 1715  azithromycin (ZITHROMAX) 500 mg in 0.9% NaCl 250 mL IVPB (admixture device)         -- IV Every 24 hours (non-standard times) 12/23/24 1625            Microbiology Results (last 7 days)       Procedure Component Value Units Date/Time    Respiratory Culture [[id number]]  (Abnormal) Collected: 12/23/24 1706    Order Status: Completed Specimen: Sputum, Expectorated Updated: 12/26/24 1207     Respiratory Culture Moderate Haemophilus influenzae     Comment: with normal respiratory rosalind  Haemophilus species- if positive beta lactamase resistant to ampicillin and amoxicillin , if negative sensitive to ampicillin and amoxicillin.  Amoxicillin/clavulanate, azithromycin, cefaclor and cefdinir are used as empiric therapy for respiratory tract infections due to Haemophilus. The results of susceptibility tests with these antimicrobial agents are often not necessary for management of individual patients. Ampicillin only is reported at [facility]. If further susceptibility is needed, please contact the Microbiology department at [phone].        Beta Lactamase Positive     GRAM STAIN Quality 3+      Many Gram Negative Rods      Many Gram positive cocci    Blood Culture [[id number]]  (Normal) Collected: 12/23/24 1640    Order Status: Completed Specimen: Blood from Arm, Left Updated: 12/26/24 0901     Blood Culture No Growth At 48 Hours    Blood Culture [[id number]]  (Normal) Collected: 12/23/24 1645    Order Status: Completed Specimen: Blood from Antecubital, Right Updated: 12/26/24 0901     Blood Culture No Growth At 48 Hours        I added tamiflu  for flu coverage.  Is improving.  Still with oxygen requirement.    Will continue care and monitor sats     Moderate persistent asthma with acute exacerbation  We will continue with nebulizations and antibiotics as above.  iv solumedrol 80 mg q-12    Obesity  Body mass index is 32.62 kg/m². Morbid obesity complicates all aspects of disease management from diagnostic modalities to treatment. Weight loss encouraged and health benefits explained to patient.         Hypertension  Patient's blood pressure range in the last 24 hours was: BP  Min: 115/73  Max: 170/75.The patient's inpatient anti-hypertensive regimen is listed below:  Current Antihypertensives  lisinopriL tablet 20 mg, Daily, Oral    Plan  - BP is controlled, no changes needed to their regimen  - continue home medicines as written  Lasix 20 mg iv this am  One elevated pressure this morning the rest of the measurements are controlled, continue meds as currently written and closely monitor    Generalized anxiety disorder  We will continue her home sertraline and PRN anxiety medications        VTE Risk Mitigation (From admission, onward)           Ordered     warfarin (COUMADIN) tablet 5 mg  Daily         12/23/24 1625     IP VTE HIGH RISK PATIENT  Once         12/23/24 1625     Reason for No Pharmacological VTE Prophylaxis  Once        Question:  Reasons:  Answer:  Already adequately anticoagulated on oral Anticoagulants    12/23/24 1625                    Discharge Planning   MARLO:      Code Status: Full Code   Medical Readiness for Discharge Date:   Discharge Plan A: Home                        Reynold Pina MD  Department of Hospital Medicine   [facility]

## 2024-12-28 LAB
INR PPP: 1.6
PROTHROMBIN TIME: 20.2 SECONDS (ref 12.5–14.5)

## 2024-12-28 PROCEDURE — 94640 AIRWAY INHALATION TREATMENT: CPT

## 2024-12-28 PROCEDURE — 94761 N-INVAS EAR/PLS OXIMETRY MLT: CPT

## 2024-12-28 PROCEDURE — 25000003 PHARM REV CODE 250: Performed by: FAMILY MEDICINE

## 2024-12-28 PROCEDURE — 63600175 PHARM REV CODE 636 W HCPCS: Performed by: FAMILY MEDICINE

## 2024-12-28 PROCEDURE — 27000207 HC ISOLATION

## 2024-12-28 PROCEDURE — 99900035 HC TECH TIME PER 15 MIN (STAT)

## 2024-12-28 PROCEDURE — 25000242 PHARM REV CODE 250 ALT 637 W/ HCPCS: Performed by: FAMILY MEDICINE

## 2024-12-28 PROCEDURE — 85610 PROTHROMBIN TIME: CPT | Performed by: FAMILY MEDICINE

## 2024-12-28 PROCEDURE — 36415 COLL VENOUS BLD VENIPUNCTURE: CPT | Performed by: FAMILY MEDICINE

## 2024-12-28 PROCEDURE — 27000221 HC OXYGEN, UP TO 24 HOURS

## 2024-12-28 PROCEDURE — 11000001 HC ACUTE MED/SURG PRIVATE ROOM

## 2024-12-28 RX ORDER — WARFARIN SODIUM 5 MG/1
5 TABLET ORAL ONCE
Status: DISCONTINUED | OUTPATIENT
Start: 2024-12-28 | End: 2024-12-30 | Stop reason: HOSPADM

## 2024-12-28 RX ORDER — CLONIDINE HYDROCHLORIDE 0.1 MG/1
0.1 TABLET ORAL EVERY 6 HOURS PRN
Status: DISCONTINUED | OUTPATIENT
Start: 2024-12-28 | End: 2024-12-30 | Stop reason: HOSPADM

## 2024-12-28 RX ORDER — AMLODIPINE BESYLATE 2.5 MG/1
2.5 TABLET ORAL DAILY
Status: DISCONTINUED | OUTPATIENT
Start: 2024-12-29 | End: 2024-12-29

## 2024-12-28 RX ORDER — LISINOPRIL 20 MG/1
20 TABLET ORAL EVERY 12 HOURS
Status: DISCONTINUED | OUTPATIENT
Start: 2024-12-28 | End: 2024-12-30 | Stop reason: HOSPADM

## 2024-12-28 RX ORDER — CLONIDINE HYDROCHLORIDE 0.1 MG/1
0.1 TABLET ORAL ONCE
Status: COMPLETED | OUTPATIENT
Start: 2024-12-28 | End: 2024-12-28

## 2024-12-28 RX ADMIN — LORAZEPAM 2 MG: 1 TABLET ORAL at 10:12

## 2024-12-28 RX ADMIN — LISINOPRIL 20 MG: 20 TABLET ORAL at 09:12

## 2024-12-28 RX ADMIN — CLONIDINE HYDROCHLORIDE 0.1 MG: 0.1 TABLET ORAL at 04:12

## 2024-12-28 RX ADMIN — IPRATROPIUM BROMIDE AND ALBUTEROL SULFATE 3 ML: .5; 3 SOLUTION RESPIRATORY (INHALATION) at 03:12

## 2024-12-28 RX ADMIN — IPRATROPIUM BROMIDE AND ALBUTEROL SULFATE 3 ML: .5; 3 SOLUTION RESPIRATORY (INHALATION) at 07:12

## 2024-12-28 RX ADMIN — MICONAZOLE NITRATE: 20 POWDER TOPICAL at 10:12

## 2024-12-28 RX ADMIN — CLONIDINE HYDROCHLORIDE 0.1 MG: 0.1 TABLET ORAL at 03:12

## 2024-12-28 RX ADMIN — AZITHROMYCIN MONOHYDRATE 500 MG: 500 INJECTION, POWDER, LYOPHILIZED, FOR SOLUTION INTRAVENOUS at 04:12

## 2024-12-28 RX ADMIN — WARFARIN SODIUM 5 MG: 5 TABLET ORAL at 04:12

## 2024-12-28 RX ADMIN — METHYLPREDNISOLONE SODIUM SUCCINATE 80 MG: 40 INJECTION, POWDER, FOR SOLUTION INTRAMUSCULAR; INTRAVENOUS at 10:12

## 2024-12-28 RX ADMIN — CEFTRIAXONE SODIUM 1 G: 1 INJECTION, POWDER, FOR SOLUTION INTRAMUSCULAR; INTRAVENOUS at 05:12

## 2024-12-28 RX ADMIN — CLONIDINE HYDROCHLORIDE 0.1 MG: 0.1 TABLET ORAL at 10:12

## 2024-12-28 RX ADMIN — IPRATROPIUM BROMIDE AND ALBUTEROL SULFATE 3 ML: .5; 3 SOLUTION RESPIRATORY (INHALATION) at 11:12

## 2024-12-28 RX ADMIN — LEVOTHYROXINE SODIUM 100 MCG: 0.1 TABLET ORAL at 09:12

## 2024-12-28 RX ADMIN — LORAZEPAM 2 MG: 1 TABLET ORAL at 09:12

## 2024-12-28 RX ADMIN — MICONAZOLE NITRATE: 20 POWDER TOPICAL at 09:12

## 2024-12-28 RX ADMIN — LISINOPRIL 20 MG: 20 TABLET ORAL at 10:12

## 2024-12-28 RX ADMIN — QUETIAPINE FUMARATE 400 MG: 100 TABLET ORAL at 10:12

## 2024-12-28 RX ADMIN — OSELTAMIVIR PHOSPHATE 30 MG: 30 CAPSULE ORAL at 09:12

## 2024-12-28 RX ADMIN — OSELTAMIVIR PHOSPHATE 30 MG: 30 CAPSULE ORAL at 10:12

## 2024-12-28 RX ADMIN — METHYLPREDNISOLONE SODIUM SUCCINATE 80 MG: 40 INJECTION, POWDER, FOR SOLUTION INTRAMUSCULAR; INTRAVENOUS at 09:12

## 2024-12-28 RX ADMIN — SERTRALINE HYDROCHLORIDE 100 MG: 50 TABLET ORAL at 09:12

## 2024-12-28 RX ADMIN — LORAZEPAM 2 MG: 1 TABLET ORAL at 02:12

## 2024-12-28 NOTE — PLAN OF CARE
Problem: Adult Inpatient Plan of Care  Goal: Plan of Care Review  Outcome: Progressing  Goal: Patient-Specific Goal (Individualized)  Outcome: Progressing  Goal: Absence of Hospital-Acquired Illness or Injury  Outcome: Progressing  Goal: Optimal Comfort and Wellbeing  Outcome: Progressing  Goal: Readiness for Transition of Care  Outcome: Progressing     Problem: Wound  Goal: Optimal Coping  Outcome: Progressing  Goal: Optimal Functional Ability  Outcome: Progressing  Goal: Absence of Infection Signs and Symptoms  Outcome: Progressing  Goal: Improved Oral Intake  Outcome: Progressing  Goal: Optimal Pain Control and Function  Outcome: Progressing  Goal: Skin Health and Integrity  Outcome: Progressing  Goal: Optimal Wound Healing  Outcome: Progressing     Problem: Gas Exchange Impaired  Goal: Optimal Gas Exchange  Outcome: Progressing     Problem: Pneumonia  Goal: Fluid Balance  Outcome: Progressing  Goal: Resolution of Infection Signs and Symptoms  Outcome: Progressing  Goal: Effective Oxygenation and Ventilation  Outcome: Progressing     Problem: Infection  Goal: Absence of Infection Signs and Symptoms  Outcome: Progressing     Problem: Social Isolation  Goal: Social Connection Supported  Outcome: Progressing     Problem: Fatigue  Goal: Improved Activity Tolerance  Outcome: Progressing     Problem: Comorbidity Management  Goal: Blood Pressure in Desired Range  Outcome: Progressing

## 2024-12-28 NOTE — NURSING
Notified Haja BOWERS of elevated bp. New order noted to administer Clonidine 0.1 mg po x 1 dose now, then q 6hr prn as needed for bp greater than 160/90. Noted and carried out.

## 2024-12-29 LAB
ABS NEUT CALC (OHS): 8.81 X10(3)/MCL (ref 2.1–9.2)
ALBUMIN SERPL-MCNC: 3 G/DL (ref 3.4–4.8)
ALBUMIN/GLOB SERPL: 0.8 RATIO (ref 1.1–2)
ALP SERPL-CCNC: 62 UNIT/L (ref 40–150)
ALT SERPL-CCNC: 35 UNIT/L (ref 0–55)
ANION GAP SERPL CALC-SCNC: 8 MEQ/L
AST SERPL-CCNC: 18 UNIT/L (ref 5–34)
BACTERIA BLD CULT: NORMAL
BACTERIA BLD CULT: NORMAL
BILIRUB SERPL-MCNC: 0.3 MG/DL
BUN SERPL-MCNC: 27 MG/DL (ref 9.8–20.1)
CALCIUM SERPL-MCNC: 8.4 MG/DL (ref 8.4–10.2)
CHLORIDE SERPL-SCNC: 102 MMOL/L (ref 98–107)
CO2 SERPL-SCNC: 28 MMOL/L (ref 23–31)
CREAT SERPL-MCNC: 0.66 MG/DL (ref 0.55–1.02)
CREAT/UREA NIT SERPL: 41
EOSINOPHIL NFR BLD MANUAL: 0.11 X10(3)/MCL (ref 0–0.9)
EOSINOPHIL NFR BLD MANUAL: 1 % (ref 0–8)
ERYTHROCYTE [DISTWIDTH] IN BLOOD BY AUTOMATED COUNT: 14.8 % (ref 11.5–17)
GFR SERPLBLD CREATININE-BSD FMLA CKD-EPI: >60 ML/MIN/1.73/M2
GLOBULIN SER-MCNC: 4 GM/DL (ref 2.4–3.5)
GLUCOSE SERPL-MCNC: 116 MG/DL (ref 82–115)
HCT VFR BLD AUTO: 40.5 % (ref 37–47)
HGB BLD-MCNC: 13.8 G/DL (ref 12–16)
INR PPP: 1.6
LYMPHOCYTES NFR BLD MANUAL: 1.38 X10(3)/MCL
LYMPHOCYTES NFR BLD MANUAL: 13 % (ref 13–40)
MCH RBC QN AUTO: 30.7 PG (ref 27–31)
MCHC RBC AUTO-ENTMCNC: 34.1 G/DL (ref 33–36)
MCV RBC AUTO: 90.2 FL (ref 80–94)
METAMYELOCYTES NFR BLD MANUAL: 1 %
MONOCYTES NFR BLD MANUAL: 0.21 X10(3)/MCL (ref 0.1–1.3)
MONOCYTES NFR BLD MANUAL: 2 % (ref 2–11)
NEUTROPHILS NFR BLD MANUAL: 77 % (ref 47–80)
NEUTS BAND NFR BLD MANUAL: 6 % (ref 0–11)
PLATELET # BLD AUTO: 425 X10(3)/MCL (ref 130–400)
PLATELET # BLD EST: ABNORMAL 10*3/UL
PMV BLD AUTO: 9 FL (ref 7.4–10.4)
POTASSIUM SERPL-SCNC: 4.2 MMOL/L (ref 3.5–5.1)
PROT SERPL-MCNC: 7 GM/DL (ref 5.8–7.6)
PROTHROMBIN TIME: 19.9 SECONDS (ref 12.5–14.5)
RBC # BLD AUTO: 4.49 X10(6)/MCL (ref 4.2–5.4)
SODIUM SERPL-SCNC: 138 MMOL/L (ref 136–145)
WBC # BLD AUTO: 10.61 X10(3)/MCL (ref 4.5–11.5)

## 2024-12-29 PROCEDURE — 85025 COMPLETE CBC W/AUTO DIFF WBC: CPT | Performed by: FAMILY MEDICINE

## 2024-12-29 PROCEDURE — 63600175 PHARM REV CODE 636 W HCPCS: Performed by: FAMILY MEDICINE

## 2024-12-29 PROCEDURE — 94640 AIRWAY INHALATION TREATMENT: CPT

## 2024-12-29 PROCEDURE — 11000001 HC ACUTE MED/SURG PRIVATE ROOM

## 2024-12-29 PROCEDURE — 80053 COMPREHEN METABOLIC PANEL: CPT | Performed by: FAMILY MEDICINE

## 2024-12-29 PROCEDURE — 36415 COLL VENOUS BLD VENIPUNCTURE: CPT | Performed by: FAMILY MEDICINE

## 2024-12-29 PROCEDURE — 94761 N-INVAS EAR/PLS OXIMETRY MLT: CPT

## 2024-12-29 PROCEDURE — 27000221 HC OXYGEN, UP TO 24 HOURS

## 2024-12-29 PROCEDURE — 27000207 HC ISOLATION

## 2024-12-29 PROCEDURE — 25000003 PHARM REV CODE 250: Performed by: FAMILY MEDICINE

## 2024-12-29 PROCEDURE — 25000242 PHARM REV CODE 250 ALT 637 W/ HCPCS: Performed by: FAMILY MEDICINE

## 2024-12-29 PROCEDURE — 85610 PROTHROMBIN TIME: CPT | Performed by: FAMILY MEDICINE

## 2024-12-29 PROCEDURE — 99900035 HC TECH TIME PER 15 MIN (STAT)

## 2024-12-29 PROCEDURE — 63700000 PHARM REV CODE 250 ALT 637 W/O HCPCS: Performed by: FAMILY MEDICINE

## 2024-12-29 RX ORDER — AZITHROMYCIN 250 MG/1
250 TABLET, FILM COATED ORAL
Status: DISCONTINUED | OUTPATIENT
Start: 2024-12-29 | End: 2024-12-30 | Stop reason: HOSPADM

## 2024-12-29 RX ORDER — AMLODIPINE BESYLATE 2.5 MG/1
2.5 TABLET ORAL ONCE
Status: COMPLETED | OUTPATIENT
Start: 2024-12-29 | End: 2024-12-29

## 2024-12-29 RX ORDER — AMLODIPINE BESYLATE 5 MG/1
5 TABLET ORAL DAILY
Status: DISCONTINUED | OUTPATIENT
Start: 2024-12-30 | End: 2024-12-30 | Stop reason: HOSPADM

## 2024-12-29 RX ORDER — PREDNISONE 20 MG/1
40 TABLET ORAL 2 TIMES DAILY
Status: DISCONTINUED | OUTPATIENT
Start: 2024-12-29 | End: 2024-12-30 | Stop reason: HOSPADM

## 2024-12-29 RX ADMIN — OSELTAMIVIR PHOSPHATE 30 MG: 30 CAPSULE ORAL at 08:12

## 2024-12-29 RX ADMIN — IPRATROPIUM BROMIDE AND ALBUTEROL SULFATE 3 ML: .5; 3 SOLUTION RESPIRATORY (INHALATION) at 03:12

## 2024-12-29 RX ADMIN — IPRATROPIUM BROMIDE AND ALBUTEROL SULFATE 3 ML: .5; 3 SOLUTION RESPIRATORY (INHALATION) at 08:12

## 2024-12-29 RX ADMIN — CEFTRIAXONE SODIUM 1 G: 1 INJECTION, POWDER, FOR SOLUTION INTRAMUSCULAR; INTRAVENOUS at 05:12

## 2024-12-29 RX ADMIN — QUETIAPINE FUMARATE 400 MG: 100 TABLET ORAL at 08:12

## 2024-12-29 RX ADMIN — MICONAZOLE NITRATE: 20 POWDER TOPICAL at 08:12

## 2024-12-29 RX ADMIN — AMLODIPINE BESYLATE 2.5 MG: 2.5 TABLET ORAL at 09:12

## 2024-12-29 RX ADMIN — WARFARIN SODIUM 5 MG: 5 TABLET ORAL at 04:12

## 2024-12-29 RX ADMIN — AZITHROMYCIN 250 MG: 250 TABLET, FILM COATED ORAL at 04:12

## 2024-12-29 RX ADMIN — LORAZEPAM 2 MG: 1 TABLET ORAL at 08:12

## 2024-12-29 RX ADMIN — METHYLPREDNISOLONE SODIUM SUCCINATE 80 MG: 40 INJECTION, POWDER, FOR SOLUTION INTRAMUSCULAR; INTRAVENOUS at 10:12

## 2024-12-29 RX ADMIN — LISINOPRIL 20 MG: 20 TABLET ORAL at 08:12

## 2024-12-29 RX ADMIN — LEVOTHYROXINE SODIUM 100 MCG: 0.1 TABLET ORAL at 08:12

## 2024-12-29 RX ADMIN — LORAZEPAM 2 MG: 1 TABLET ORAL at 02:12

## 2024-12-29 RX ADMIN — IPRATROPIUM BROMIDE AND ALBUTEROL SULFATE 3 ML: .5; 3 SOLUTION RESPIRATORY (INHALATION) at 11:12

## 2024-12-29 RX ADMIN — AMLODIPINE BESYLATE 2.5 MG: 2.5 TABLET ORAL at 08:12

## 2024-12-29 RX ADMIN — PREDNISONE 40 MG: 20 TABLET ORAL at 08:12

## 2024-12-29 RX ADMIN — CLONIDINE HYDROCHLORIDE 0.1 MG: 0.1 TABLET ORAL at 02:12

## 2024-12-29 RX ADMIN — SERTRALINE HYDROCHLORIDE 100 MG: 50 TABLET ORAL at 08:12

## 2024-12-29 RX ADMIN — ONDANSETRON 4 MG: 2 INJECTION INTRAMUSCULAR; INTRAVENOUS at 10:12

## 2024-12-29 RX ADMIN — CLONIDINE HYDROCHLORIDE 0.1 MG: 0.1 TABLET ORAL at 01:12

## 2024-12-29 NOTE — PROGRESS NOTES
Progress Note    Admit Date: 12/23/2024   LOS: 5 days     SUBJECTIVE:     Follow-up For:  pneumonia and flu  Still w  some dyspnea   And w  elevated bp  overnight    Was discussion at check out of possible  dc.   That  will no occur  today    Scheduled Meds:   albuterol-ipratropium  3 mL Nebulization QID    [START ON 12/29/2024] amLODIPine  2.5 mg Oral Daily    azithromycin  500 mg Intravenous Q24H    cefTRIAXone  1 g Intravenous Q24H    levothyroxine  100 mcg Oral Daily    lisinopriL  20 mg Oral Q12H    LORazepam  2 mg Oral TID    methylPREDNISolone injection (PEDS and ADULTS)  80 mg Intravenous Q12H    miconazole NITRATE 2 %   Topical (Top) BID    oseltamivir  30 mg Oral BID    QUEtiapine  400 mg Oral QHS    sertraline  100 mg Oral Daily    warfarin  5 mg Oral Daily    warfarin  5 mg Oral Once     Continuous Infusions:  PRN Meds:  Current Facility-Administered Medications:     acetaminophen, 1,000 mg, Oral, Q8H PRN    acetaminophen, 650 mg, Oral, Q8H PRN    acetaminophen, 650 mg, Oral, Q4H PRN    albuterol-ipratropium, 3 mL, Nebulization, Q6H PRN    cloNIDine, 0.1 mg, Oral, Q6H PRN    guaiFENesin-codeine 100-10 mg/5 ml, 5 mL, Oral, Q4H PRN    magnesium oxide, 800 mg, Oral, PRN    naloxone, 0.02 mg, Intravenous, PRN    ondansetron, 4 mg, Intravenous, Q8H PRN    potassium bicarbonate, 35 mEq, Oral, PRN    simethicone, 1 tablet, Oral, QID PRN    Review of patient's allergies indicates:   Allergen Reactions    Pcn [penicillins] Hives    Aspirin     Influenza virus vaccines        Review of Systems  ROS    OBJECTIVE:     Vital Signs (Most Recent)  Temp: 98.1 °F (36.7 °C) (12/28/24 1510)  Pulse: 93 (12/28/24 1615)  Resp: 18 (12/28/24 1555)  BP: (!) 163/89 (12/28/24 1615)  SpO2: (!) 93 % (12/28/24 1555)    Vital Signs Range (Last 24H):  Temp:  [97.5 °F (36.4 °C)-98.5 °F (36.9 °C)]   Pulse:  [84-99]   Resp:  [18-20]   BP: (148-192)/()   SpO2:  [92 %-94 %]     I & O (Last 24H):  Intake/Output Summary (Last 24 hours)  at 12/28/2024 1822  Last data filed at 12/28/2024 1200  Gross per 24 hour   Intake 826.13 ml   Output --   Net 826.13 ml     Physical Exam:  Physical Exam   Vitals:    12/28/24 1615   BP: (!) 163/89   Pulse: 93   Resp:    Temp:        Physical Exam   Constitutional: alert & oriented, no acute distress  HENT:   Head: Normocephalic and atraumatic.   Eyes: Conjunctivae normal and EOM are normal. Pupils are equal, round, and reactive to light.   Neck: Normal range of motion. Neck supple.   Cardiovascular: Normal rate, regular rhythm, normal heart sounds   Pulmonary/Chest: wheezing     Abdominal: Soft, nontender, bowel sounds normal  Neurological: nonfocal  Skin: warm, dry intact  Psychiatric: normal mood and affect, cooperative        Laboratory:  Recent Results (from the past 24 hours)   Protime-INR    Collection Time: 12/28/24  3:51 AM   Result Value Ref Range    PT 20.2 (H) 12.5 - 14.5 seconds    INR 1.6 (H) <=1.3        Diagnostic Results:  X-Ray Chest 1 View    Result Date: 12/24/2024  EXAMINATION: XR CHEST 1 VIEW CLINICAL HISTORY: dyspnea;, . COMPARISON: 11/09/2016 FINDINGS: An AP view or more reveals the heart to be borderline enlarged.  Trachea is mostly midline.  Atherosclerosis seen within a prominent aortic arch.  Hazy patchy increased is evident the left lower lung.  There is mild elevation of the right hemidiaphragm.  Degenerative changes are noted to the thoracic spine.  Bony structures are osteopenic.     1. Borderline cardiomegaly 2. Patchy infiltrate and or atelectasis left lower lung 3. Mildly elevated right hemidiaphragm 4. Atherosclerosis within a prominent aortic arch Electronically signed by: Marcus Geronimo Date:    12/24/2024 Time:    08:44       ASSESSMENT/PLAN:       Plan:   Patient Active Problem List    Diagnosis Date Noted    Influenza A 12/25/2024    Pneumonia of left lower lobe due to infectious organism 12/24/2024    Generalized anxiety disorder 12/23/2024    Hypertension 12/23/2024     Obesity 12/23/2024    Hypoxemia 12/23/2024    Moderate persistent asthma with acute exacerbation 12/23/2024   Change lisinopril 20  to  bid   And add  amlodipine 2.5   For her bp     Her  inr is  low   Add a  one time dose of coumadin  5  today and recheck     Add clonidine  01. Prn  bp  elevation   Pos  h  flu  on her  resp  cx   Cont zithromax

## 2024-12-29 NOTE — PROGRESS NOTES
Progress Note    Admit Date: 12/23/2024   LOS: 6 days     SUBJECTIVE:     Follow-up For:  pneumonia and flu  Still w  some dyspnea   And w  elevated bp  overnight    Was discussion at check out of possible  dc.   That  will no occur  today    Scheduled Meds:   albuterol-ipratropium  3 mL Nebulization QID    amLODIPine  2.5 mg Oral Once    [START ON 12/30/2024] amLODIPine  5 mg Oral Daily    azithromycin  500 mg Intravenous Q24H    cefTRIAXone  1 g Intravenous Q24H    levothyroxine  100 mcg Oral Daily    lisinopriL  20 mg Oral Q12H    LORazepam  2 mg Oral TID    methylPREDNISolone injection (PEDS and ADULTS)  80 mg Intravenous Q12H    miconazole NITRATE 2 %   Topical (Top) BID    QUEtiapine  400 mg Oral QHS    sertraline  100 mg Oral Daily    warfarin  5 mg Oral Daily    warfarin  5 mg Oral Once     Continuous Infusions:  PRN Meds:  Current Facility-Administered Medications:     acetaminophen, 1,000 mg, Oral, Q8H PRN    acetaminophen, 650 mg, Oral, Q8H PRN    acetaminophen, 650 mg, Oral, Q4H PRN    albuterol-ipratropium, 3 mL, Nebulization, Q6H PRN    cloNIDine, 0.1 mg, Oral, Q6H PRN    guaiFENesin-codeine 100-10 mg/5 ml, 5 mL, Oral, Q4H PRN    magnesium oxide, 800 mg, Oral, PRN    naloxone, 0.02 mg, Intravenous, PRN    ondansetron, 4 mg, Intravenous, Q8H PRN    potassium bicarbonate, 35 mEq, Oral, PRN    simethicone, 1 tablet, Oral, QID PRN    Review of patient's allergies indicates:   Allergen Reactions    Pcn [penicillins] Hives    Aspirin     Influenza virus vaccines        Review of Systems  ROS    OBJECTIVE:     Vital Signs (Most Recent)  Temp: 98.5 °F (36.9 °C) (12/29/24 0749)  Pulse: 88 (12/29/24 0803)  Resp: 18 (12/29/24 0803)  BP: (!) 176/98 (12/29/24 0749)  SpO2: (!) 93 % (12/29/24 0803)    Vital Signs Range (Last 24H):  Temp:  [97.5 °F (36.4 °C)-98.5 °F (36.9 °C)]   Pulse:  [83-99]   Resp:  [18-20]   BP: (148-192)/()   SpO2:  [92 %-95 %]     I & O (Last 24H):  Intake/Output Summary (Last 24 hours)  at 12/29/2024 0908  Last data filed at 12/29/2024 0227  Gross per 24 hour   Intake 240 ml   Output 250 ml   Net -10 ml     Physical Exam:  Physical Exam   Vitals:    12/29/24 0803   BP:    Pulse: 88   Resp: 18   Temp:        Physical Exam   Constitutional: alert & oriented, no acute distress  HENT:   Head: Normocephalic and atraumatic.   Eyes: Conjunctivae normal and EOM are normal. Pupils are equal, round, and reactive to light.   Neck: Normal range of motion. Neck supple.   Cardiovascular: Normal rate, regular rhythm, normal heart sounds   Pulmonary/Chest: wheezing     Abdominal: Soft, nontender, bowel sounds normal  Neurological: nonfocal  Skin: warm, dry intact  Psychiatric: normal mood and affect, cooperative        Laboratory:  Recent Results (from the past 24 hours)   Protime-INR    Collection Time: 12/29/24  3:36 AM   Result Value Ref Range    PT 19.9 (H) 12.5 - 14.5 seconds    INR 1.6 (H) <=1.3        Diagnostic Results:  X-Ray Chest 1 View    Result Date: 12/24/2024  EXAMINATION: XR CHEST 1 VIEW CLINICAL HISTORY: dyspnea;, . COMPARISON: 11/09/2016 FINDINGS: An AP view or more reveals the heart to be borderline enlarged.  Trachea is mostly midline.  Atherosclerosis seen within a prominent aortic arch.  Hazy patchy increased is evident the left lower lung.  There is mild elevation of the right hemidiaphragm.  Degenerative changes are noted to the thoracic spine.  Bony structures are osteopenic.     1. Borderline cardiomegaly 2. Patchy infiltrate and or atelectasis left lower lung 3. Mildly elevated right hemidiaphragm 4. Atherosclerosis within a prominent aortic arch Electronically signed by: Marcus Geronimo Date:    12/24/2024 Time:    08:44       ASSESSMENT/PLAN:       Plan:   Patient Active Problem List    Diagnosis Date Noted    Influenza A 12/25/2024    Pneumonia of left lower lobe due to infectious organism 12/24/2024    Generalized anxiety disorder 12/23/2024    Hypertension 12/23/2024    Obesity  12/23/2024    Hypoxemia 12/23/2024    Moderate persistent asthma with acute exacerbation 12/23/2024   Change lisinopril 20  to  bid   And add  amlodipine 2.5   For her bp     Her  inr is  low   Add a  one time dose of coumadin  5  today and recheck     Add clonidine  01. Prn  bp  elevation   Pos  h  flu  on her  resp  cx   Cont zithromax     12/29  Noted in chart that extra dose of coumadin  refused.     Increase   amlodipine to  5.  She is  breathing better   Moving air  preeti r    In an effort to  move toward dc. Will dc solumedrol and change to prednisone

## 2024-12-29 NOTE — PROGRESS NOTES
Pharmacist Intervention IV to PO Note    Mary Aguayo is a 74 y.o. female being treated with IV medication azithromycin    Patient Data:    Vital Signs (Most Recent):  Temp: 98.5 °F (36.9 °C) (12/29/24 0749)  Pulse: 85 (12/29/24 0930)  Resp: 18 (12/29/24 0803)  BP: (!) 182/96 (12/29/24 0930)  SpO2: (!) 93 % (12/29/24 0803) Vital Signs (72h Range):  Temp:  [97.3 °F (36.3 °C)-98.7 °F (37.1 °C)]   Pulse:  []   Resp:  [16-22]   BP: (115-192)/()   SpO2:  [90 %-98 %]      CBC:  Recent Labs   Lab 12/24/24  0451 12/25/24  0431 12/26/24  0458   WBC 12.20* 8.29 7.00   RBC 4.30 4.13* 4.08*   HGB 13.3 12.9 12.8   HCT 40.1 40.3 38.5    256 316   MCV 93.3 97.6* 94.4*   MCH 30.9 31.2* 31.4*   MCHC 33.2 32.0* 33.2     CMP:     Recent Labs   Lab 12/23/24  1640 12/24/24  0451 12/25/24  0431 12/26/24  0458   CALCIUM 9.7 8.9 8.6 8.4   ALBUMIN 3.7 3.2*  --  2.8*    141 141 141   K 3.1* 3.0* 3.3* 3.9   CO2 26 28 30 31    99 101 102   BUN 28.0* 18.0 25.0* 33.0*   CREATININE 1.10* 0.81 1.45* 1.02   ALKPHOS 93 80  --  62   ALT 19 16  --  15   AST 21 22  --  16   BILITOT 0.3 0.3  --  0.2       Dietary Orders:  Diet Orders            Diet Adult Regular Isolation Tray - Styrofoam: Regular starting at 12/24 1919            Based on the following criteria, this patient qualifies for intravenous to oral conversion:  [x] The patients gastrointestinal tract is functioning (tolerating medications via oral or enteral route for 24 hours and tolerating food or enteral feeds for 24 hours).  [x] The patient is hemodynamically stable for 24 hours (heart rate <100 beats per minute, systolic blood pressure >99 mm Hg, and respiratory rate <20 breaths per minute).  [x] The patient shows clinical improvement (afebrile for at least 24 hours and white blood cell count downtrending or normalized). Additionally, the patient must be non-neutropenic (absolute neutrophil count >500 cells/mm3).  [x] For antimicrobials, the  patient has received IV therapy for at least 24 hours.    IV medication azithromycin will be changed to oral medication azithromycin    Pharmacist's Name: Rivera Macias  Pharmacist's Extension: [phone]

## 2024-12-30 VITALS
OXYGEN SATURATION: 95 % | WEIGHT: 190.06 LBS | DIASTOLIC BLOOD PRESSURE: 73 MMHG | TEMPERATURE: 98 F | RESPIRATION RATE: 20 BRPM | BODY MASS INDEX: 32.45 KG/M2 | SYSTOLIC BLOOD PRESSURE: 146 MMHG | HEIGHT: 64 IN | HEART RATE: 83 BPM

## 2024-12-30 LAB
ABS NEUT CALC (OHS): 7.15 X10(3)/MCL (ref 2.1–9.2)
ANISOCYTOSIS BLD QL SMEAR: SLIGHT
ERYTHROCYTE [DISTWIDTH] IN BLOOD BY AUTOMATED COUNT: 14.9 % (ref 11.5–17)
HCT VFR BLD AUTO: 39.4 % (ref 37–47)
HGB BLD-MCNC: 13.1 G/DL (ref 12–16)
INR PPP: 1.6
LYMPHOCYTES NFR BLD MANUAL: 3.35 X10(3)/MCL
LYMPHOCYTES NFR BLD MANUAL: 30 % (ref 13–40)
MCH RBC QN AUTO: 30.6 PG (ref 27–31)
MCHC RBC AUTO-ENTMCNC: 33.2 G/DL (ref 33–36)
MCV RBC AUTO: 92.1 FL (ref 80–94)
METAMYELOCYTES NFR BLD MANUAL: 1 %
MONOCYTES NFR BLD MANUAL: 0.45 X10(3)/MCL (ref 0.1–1.3)
MONOCYTES NFR BLD MANUAL: 4 % (ref 2–11)
MYELOCYTES NFR BLD MANUAL: 1 %
NEUTROPHILS NFR BLD MANUAL: 64 % (ref 47–80)
PLATELET # BLD AUTO: 440 X10(3)/MCL (ref 130–400)
PLATELET # BLD EST: ABNORMAL 10*3/UL
PMV BLD AUTO: 9.5 FL (ref 7.4–10.4)
POIKILOCYTOSIS BLD QL SMEAR: SLIGHT
PROTHROMBIN TIME: 19.5 SECONDS (ref 12.5–14.5)
RBC # BLD AUTO: 4.28 X10(6)/MCL (ref 4.2–5.4)
WBC # BLD AUTO: 11.17 X10(3)/MCL (ref 4.5–11.5)

## 2024-12-30 PROCEDURE — 25000003 PHARM REV CODE 250: Performed by: FAMILY MEDICINE

## 2024-12-30 PROCEDURE — 25000242 PHARM REV CODE 250 ALT 637 W/ HCPCS: Performed by: FAMILY MEDICINE

## 2024-12-30 PROCEDURE — 63600175 PHARM REV CODE 636 W HCPCS: Performed by: FAMILY MEDICINE

## 2024-12-30 PROCEDURE — 94640 AIRWAY INHALATION TREATMENT: CPT

## 2024-12-30 PROCEDURE — 99900035 HC TECH TIME PER 15 MIN (STAT)

## 2024-12-30 PROCEDURE — 36415 COLL VENOUS BLD VENIPUNCTURE: CPT | Performed by: FAMILY MEDICINE

## 2024-12-30 PROCEDURE — 85610 PROTHROMBIN TIME: CPT | Performed by: FAMILY MEDICINE

## 2024-12-30 PROCEDURE — 85025 COMPLETE CBC W/AUTO DIFF WBC: CPT | Performed by: FAMILY MEDICINE

## 2024-12-30 PROCEDURE — 94761 N-INVAS EAR/PLS OXIMETRY MLT: CPT

## 2024-12-30 RX ORDER — PREDNISONE 20 MG/1
40 TABLET ORAL 2 TIMES DAILY
Qty: 8 TABLET | Refills: 0 | Status: SHIPPED | OUTPATIENT
Start: 2024-12-30

## 2024-12-30 RX ORDER — ALBUTEROL SULFATE 90 UG/1
2 INHALANT RESPIRATORY (INHALATION) EVERY 6 HOURS PRN
Qty: 8 G | Refills: 0 | Status: SHIPPED | OUTPATIENT
Start: 2024-12-30

## 2024-12-30 RX ORDER — CEFDINIR 300 MG/1
300 CAPSULE ORAL 2 TIMES DAILY
Qty: 14 CAPSULE | Refills: 0 | Status: SHIPPED | OUTPATIENT
Start: 2024-12-30 | End: 2025-01-09

## 2024-12-30 RX ORDER — AMLODIPINE BESYLATE 5 MG/1
5 TABLET ORAL DAILY
Qty: 90 TABLET | Refills: 3 | Status: SHIPPED | OUTPATIENT
Start: 2024-12-30 | End: 2025-12-30

## 2024-12-30 RX ORDER — SERTRALINE HYDROCHLORIDE 100 MG/1
100 TABLET, FILM COATED ORAL DAILY
Qty: 30 TABLET | Refills: 11 | Status: SHIPPED | OUTPATIENT
Start: 2024-12-30 | End: 2025-12-30

## 2024-12-30 RX ORDER — AZITHROMYCIN 250 MG/1
500 TABLET, FILM COATED ORAL DAILY
Qty: 7 TABLET | Refills: 0 | Status: SHIPPED | OUTPATIENT
Start: 2024-12-30

## 2024-12-30 RX ORDER — CODEINE PHOSPHATE AND GUAIFENESIN 10; 100 MG/5ML; MG/5ML
5 SOLUTION ORAL EVERY 4 HOURS PRN
Qty: 200 ML | Refills: 0 | Status: SHIPPED | OUTPATIENT
Start: 2024-12-30 | End: 2025-01-09

## 2024-12-30 RX ORDER — IPRATROPIUM BROMIDE AND ALBUTEROL SULFATE 2.5; .5 MG/3ML; MG/3ML
3 SOLUTION RESPIRATORY (INHALATION) 4 TIMES DAILY
Qty: 75 ML | Refills: 0 | Status: SHIPPED | OUTPATIENT
Start: 2024-12-30 | End: 2025-12-30

## 2024-12-30 RX ADMIN — MICONAZOLE NITRATE: 20 POWDER TOPICAL at 09:12

## 2024-12-30 RX ADMIN — LEVOTHYROXINE SODIUM 100 MCG: 0.1 TABLET ORAL at 09:12

## 2024-12-30 RX ADMIN — LORAZEPAM 2 MG: 1 TABLET ORAL at 09:12

## 2024-12-30 RX ADMIN — IPRATROPIUM BROMIDE AND ALBUTEROL SULFATE 3 ML: .5; 3 SOLUTION RESPIRATORY (INHALATION) at 07:12

## 2024-12-30 RX ADMIN — AMLODIPINE BESYLATE 5 MG: 5 TABLET ORAL at 09:12

## 2024-12-30 RX ADMIN — CLONIDINE HYDROCHLORIDE 0.1 MG: 0.1 TABLET ORAL at 01:12

## 2024-12-30 RX ADMIN — PREDNISONE 40 MG: 20 TABLET ORAL at 09:12

## 2024-12-30 RX ADMIN — SERTRALINE HYDROCHLORIDE 100 MG: 50 TABLET ORAL at 09:12

## 2024-12-30 RX ADMIN — LISINOPRIL 20 MG: 20 TABLET ORAL at 09:12

## 2024-12-30 NOTE — PLAN OF CARE
12/30/24 1040   Final Note   Assessment Type Final Discharge Note   Anticipated Discharge Disposition Home   Post-Acute Status   Discharge Delays None known at this time     D/c home.  No needs.

## 2024-12-30 NOTE — PLAN OF CARE
Problem: Adult Inpatient Plan of Care  Goal: Plan of Care Review  Outcome: Met  Goal: Patient-Specific Goal (Individualized)  Outcome: Met  Goal: Absence of Hospital-Acquired Illness or Injury  Outcome: Met  Goal: Optimal Comfort and Wellbeing  Outcome: Met  Goal: Readiness for Transition of Care  Outcome: Met     Problem: Wound  Goal: Optimal Coping  Outcome: Met  Goal: Optimal Functional Ability  Outcome: Met  Goal: Absence of Infection Signs and Symptoms  Outcome: Met  Goal: Improved Oral Intake  Outcome: Met  Goal: Optimal Pain Control and Function  Outcome: Met  Goal: Skin Health and Integrity  Outcome: Met  Goal: Optimal Wound Healing  Outcome: Met     Problem: Pneumonia  Goal: Fluid Balance  Outcome: Met  Goal: Resolution of Infection Signs and Symptoms  Outcome: Met  Goal: Effective Oxygenation and Ventilation  Outcome: Met     Problem: Gas Exchange Impaired  Goal: Optimal Gas Exchange  Outcome: Met     Problem: Infection  Goal: Absence of Infection Signs and Symptoms  Outcome: Met     Problem: Fatigue  Goal: Improved Activity Tolerance  Outcome: Met     Problem: Social Isolation  Goal: Social Connection Supported  Outcome: Met     Problem: Comorbidity Management  Goal: Blood Pressure in Desired Range  Outcome: Met

## 2025-01-01 NOTE — DISCHARGE SUMMARY
[facility] - Medical Surgical Unit  Hospital Medicine  Discharge Summary      Patient Name: Mary Aguayo  MRN: [id number]  [facility]: [id number]  Patient Class: IP- Inpatient  Admission Date: 12/23/2024  Hospital Length of Stay: 7 days  Discharge Date and Time:  01/01/2025 10:53 AM  Attending Physician: No att. providers found   Discharging Provider: Reynold Pina MD  Primary Care Provider: Reynold Pina MD    Primary Care Team: Networked reference to record PCT     HPI:   The patient is a 74-year-old  female known to me from clinic.  Presented today with a one-week history of shortness a breath cough with fever of 101.  States she and her  were both ill since last Tuesday.  States began with nasal congestion has since progressed to cough with dyspnea on exertion shortness for breath and wheezing.  Her cough is productive of yellow to green sputum.  She has not had chest pain is not had  hemoptysis.  Her respiration is occasionally painful.      States she has had a decrease in activity has general malaise and myalgia.  No noted rashes.    Her shortness breath is controlled currently with nasal cannula oxygen.  On admission had in our office her sats were in the mid to upper 80s on room air.  After exertion she was in the lower 80s.    * No surgery found *      Hospital Course:   The patient is a 74-year-old  female known to me from clinic.  Presented today with a one-week history of shortness a breath cough with fever of 101.  States she and her  were both ill since last Tuesday.  States began with nasal congestion has since progressed to cough with dyspnea on exertion shortness for breath and wheezing.  Her cough is productive of yellow to green sputum.  She has not had chest pain is not had  hemoptysis.  Her respiration is occasionally painful.       States she has had a decrease in activity has general malaise and myalgia.  No noted rashes.     Her  shortness breath is controlled currently with nasal cannula oxygen.  On admission had in our office her sats were in the mid to upper 80s on room air.  After exertion she was in the lower 80s.  Noted to have influenza a as well as Haemophilus influenzae on admit.  Starting with influenza a infection in her mouth was influenza superimposed pneumonia.  Was covered with Tamiflu throughout her stay as well as the addition of IV antibiotics.  She did gradually clear her infiltrates from chest x-ray and was able to gradually wean off of her oxygen.  Sustaining a sat greater than 96% on room air.  We did have to start IV steroids because of her history of reactive airway disease she reacted well to this and continued to show improvement.   She is feeling better today.  No longer requiring supplemental oxygen with significant improvement.  left lower lobe pneumonia on chest x-ray is clearing.  Likely progression was from a flu initial illness with a secondary bacterial heamophilus pneumonia.  She i was on Rocephin and azithromycin and has had a resolution of her leukocytosis.  She has remained afebrile.  States that shortness breath is  improved. she is little stronger relative to her baseline.    Wheezing resolved     Denies chest pain or palpitations at this time.    Creatinine and potassium are wnl       Goals of Care Treatment Preferences:  Code Status: Full Code      SDOH Screening:  The patient declined to be screened for utility difficulties, food insecurity, transport difficulties, housing insecurity, and interpersonal safety, so no concerns could be identified this admission.     Consults:     * Hypoxemia     there is an underlying pneumonia.     Resolved    Influenza A  Completed tamiflu      Pneumonia of left lower lobe due to infectious organism  Patient has a diagnosis of pneumonia. The cause of the pneumonia is h flu. The pneumonia is stable. The patient has the following signs/symptoms of pneumonia: cough,  sputum production, and shortness of breath.  Patient will be discharged with nebulized albuterol as well as albuterol HFA 4 times a day she will also receive cefdinir as well as azithromycin at home.  She has completed her course of Tamiflu.    Antibiotics (From admission, onward)      None            Microbiology Results (last 7 days)       Procedure Component Value Units Date/Time    Blood Culture [[id number]]  (Normal) Collected: 12/23/24 1640    Order Status: Completed Specimen: Blood from Arm, Left Updated: 12/29/24 0901     Blood Culture No Growth at 5 days    Blood Culture [[id number]]  (Normal) Collected: 12/23/24 1645    Order Status: Completed Specimen: Blood from Antecubital, Right Updated: 12/29/24 0901     Blood Culture No Growth at 5 days    Respiratory Culture [[id number]]  (Abnormal) Collected: 12/23/24 1706    Order Status: Completed Specimen: Sputum, Expectorated Updated: 12/26/24 1207     Respiratory Culture Moderate Haemophilus influenzae     Comment: with normal respiratory rosalind  Haemophilus species- if positive beta lactamase resistant to ampicillin and amoxicillin , if negative sensitive to ampicillin and amoxicillin.  Amoxicillin/clavulanate, azithromycin, cefaclor and cefdinir are used as empiric therapy for respiratory tract infections due to Haemophilus. The results of susceptibility tests with these antimicrobial agents are often not necessary for management of individual patients. Ampicillin only is reported at [facility]. If further susceptibility is needed, please contact the Microbiology department at [phone].        Beta Lactamase Positive     GRAM STAIN Quality 3+      Many Gram Negative Rods      Many Gram positive cocci        I added tamiflu  for flu coverage.  Is improving.  Still with oxygen requirement.   Will continue care and monitor sats     Moderate persistent asthma with acute exacerbation  We will continue with nebulizations and antibiotics .  Discharged on prednisone 40 mg  twice a day to follow up with me within 2 days clinic.    Obesity  Body mass index is 32.62 kg/m². Morbid obesity complicates all aspects of disease management from diagnostic modalities to treatment. Weight loss encouraged and health benefits explained to patient.         Hypertension  Patient's blood pressure range in the last 24 hours was: No data recorded.The patient's inpatient anti-hypertensive regimen is listed below:  Current Antihypertensives  amlodipine (NORVASC) tablet, Daily, Oral    Plan  - BP is controlled, no changes needed to their regimen  - continue home medicines as written  Lasix 20 mg iv this am  One elevated pressure this morning the rest of the measurements are controlled, continue meds as currently written and closely monitor    Generalized anxiety disorder  We will continue her home sertraline and PRN anxiety medications        Final Active Diagnoses:    Diagnosis Date Noted POA    PRINCIPAL PROBLEM:  Hypoxemia [R09.02] 12/23/2024 Yes    Influenza A [J10.1] 12/25/2024 Yes    Pneumonia of left lower lobe due to infectious organism [J18.9] 12/24/2024 Yes    Moderate persistent asthma with acute exacerbation [J45.41] 12/23/2024 Yes    Generalized anxiety disorder [F41.1] 12/23/2024 Yes    Hypertension [I10] 12/23/2024 Yes    Obesity [E66.9] 12/23/2024 Yes      Problems Resolved During this Admission:       Discharged Condition: good    Disposition: Home or Self Care    Follow Up:   Follow-up Information       Reynold Pina MD Follow up in 3 day(s).    Specialty: Family Medicine  Why: follow up with Dr. Pina on 01/3/2025 at 2:00pm  Contact information:  [address]  [address]  [place] [id number] [phone]                           Patient Instructions:      Diet Adult Regular     Activity as tolerated       Significant Diagnostic Studies: N/A    Pending Diagnostic Studies:       None           Medications:  Reconciled Home Medications:      Medication List        START taking these  medications      albuterol 90 mcg/actuation inhaler  Commonly known as: VENTOLIN HFA  Inhale 2 puffs into the lungs every 6 (six) hours as needed for Wheezing. Rescue     albuterol-ipratropium 2.5 mg-0.5 mg/3 mL nebulizer solution  Commonly known as: DUO-NEB  Take 3 mLs by nebulization 4 (four) times daily. Rescue     amLODIPine 5 MG tablet  Commonly known as: NORVASC  Take 1 tablet (5 mg total) by mouth once daily.     azithromycin 250 MG tablet  Commonly known as: Z-MILI  Take 2 tablets (500 mg total) by mouth once daily.     cefdinir 300 MG capsule  Commonly known as: OMNICEF  Take 1 capsule (300 mg total) by mouth 2 (two) times daily. for 10 days     guaiFENesin-codeine 100-10 mg/5 ml  mg/5 mL syrup  Commonly known as: TUSSI-ORGANIDIN NR  Take 5 mLs by mouth every 4 (four) hours as needed for Cough.     predniSONE 20 MG tablet  Commonly known as: DELTASONE  Take 2 tablets (40 mg total) by mouth 2 (two) times daily.     sertraline 100 MG tablet  Commonly known as: ZOLOFT  Take 1 tablet (100 mg total) by mouth once daily.            CONTINUE taking these medications      levothyroxine 100 MCG tablet  Commonly known as: SYNTHROID  Take 100 mcg by mouth once daily.     lisinopriL 20 MG tablet  Commonly known as: PRINIVIL,ZESTRIL  Take 20 mg by mouth once daily.     LORazepam 2 MG Tab  Commonly known as: ATIVAN  Take 2 mg by mouth 3 (three) times daily.     QUEtiapine 400 MG Tb24  Commonly known as: SEROQUEL XR  Take 400 mg by mouth every evening.     tiZANidine 4 MG tablet  Commonly known as: ZANAFLEX  Take 1 tablet (4 mg total) by mouth every 8 (eight) hours as needed.     warfarin 5 MG tablet  Commonly known as: COUMADIN  Take 5 mg by mouth Daily.            STOP taking these medications      DULoxetine 60 MG capsule  Commonly known as: CYMBALTA     gabapentin 300 MG capsule  Commonly known as: NEURONTIN              Indwelling Lines/Drains at time of discharge:   Lines/Drains/Airways       None                    Time spent on the discharge of patient: 35 minutes         Reynold Pina MD  Department of Hospital Medicine  [facility]

## 2025-01-01 NOTE — ASSESSMENT & PLAN NOTE
Patient's blood pressure range in the last 24 hours was: No data recorded.The patient's inpatient anti-hypertensive regimen is listed below:  Current Antihypertensives  amlodipine (NORVASC) tablet, Daily, Oral    Plan  - BP is controlled, no changes needed to their regimen  - continue home medicines as written  Lasix 20 mg iv this am  One elevated pressure this morning the rest of the measurements are controlled, continue meds as currently written and closely monitor

## 2025-01-01 NOTE — ASSESSMENT & PLAN NOTE
Patient has a diagnosis of pneumonia. The cause of the pneumonia is h flu. The pneumonia is stable. The patient has the following signs/symptoms of pneumonia: cough, sputum production, and shortness of breath.  Patient will be discharged with nebulized albuterol as well as albuterol HFA 4 times a day she will also receive cefdinir as well as azithromycin at home.  She has completed her course of Tamiflu.    Antibiotics (From admission, onward)      None            Microbiology Results (last 7 days)       Procedure Component Value Units Date/Time    Blood Culture [[id number]]  (Normal) Collected: 12/23/24 1640    Order Status: Completed Specimen: Blood from Arm, Left Updated: 12/29/24 0901     Blood Culture No Growth at 5 days    Blood Culture [[id number]]  (Normal) Collected: 12/23/24 1645    Order Status: Completed Specimen: Blood from Antecubital, Right Updated: 12/29/24 0901     Blood Culture No Growth at 5 days    Respiratory Culture [[id number]]  (Abnormal) Collected: 12/23/24 1706    Order Status: Completed Specimen: Sputum, Expectorated Updated: 12/26/24 1207     Respiratory Culture Moderate Haemophilus influenzae     Comment: with normal respiratory rosalind  Haemophilus species- if positive beta lactamase resistant to ampicillin and amoxicillin , if negative sensitive to ampicillin and amoxicillin.  Amoxicillin/clavulanate, azithromycin, cefaclor and cefdinir are used as empiric therapy for respiratory tract infections due to Haemophilus. The results of susceptibility tests with these antimicrobial agents are often not necessary for management of individual patients. Ampicillin only is reported at [facility]. If further susceptibility is needed, please contact the Microbiology department at [phone].        Beta Lactamase Positive     GRAM STAIN Quality 3+      Many Gram Negative Rods      Many Gram positive cocci        I added tamiflu  for flu coverage.  Is improving.  Still with oxygen requirement.   Will  continue care and monitor sats

## 2025-01-01 NOTE — ASSESSMENT & PLAN NOTE
We will continue with nebulizations and antibiotics .  Discharged on prednisone 40 mg twice a day to follow up with me within 2 days clinic.

## 2025-01-06 ENCOUNTER — PATIENT OUTREACH (OUTPATIENT)
Dept: ADMINISTRATIVE | Facility: CLINIC | Age: 75
End: 2025-01-06
Payer: MEDICARE

## 2025-01-06 NOTE — PROGRESS NOTES
C3 nurse attempted to contact Mary Aguayo for a TCC post hospital discharge follow up call. No answer. Left voicemail with callback information. The patient had a scheduled HOSFU appointment with Reynold Pina MD (PCP) on 1/3/25 @ 2:00pm, per AVS, but no documentation is available regarding this encounter.

## 2025-01-06 NOTE — PROGRESS NOTES
2nd attempt-C3 nurse attempted to contact Mary Maurisiomary Brotherscrista for a TCC post hospital discharge follow up call. No answer. Left voicemail with callback information. The patient had a scheduled HOSFU appointment with Reynold Pina MD (PCP) on 1/3/25 @ 2:00pm, per AVS, but no documentation is available regarding this encounter.

## 2025-01-07 NOTE — PROGRESS NOTES
3rd attempt-C3 nurse attempted to contact Mary Garzaedisonmary Brotherscrista for a TCC post hospital discharge follow up call. No answer. Left voicemail with callback information, and OOC#. The patient had a scheduled HOSFU appointment with Reynold Pina MD (PCP) on 1/3/25 @ 2:00pm, per AVS, but no documentation is available regarding this encounter.

## 2025-04-14 DIAGNOSIS — M47.816 LUMBAR SPONDYLOSIS: Primary | ICD-10-CM

## 2025-04-23 ENCOUNTER — TELEPHONE (OUTPATIENT)
Dept: NEUROSURGERY | Facility: CLINIC | Age: 75
End: 2025-04-23
Payer: MEDICARE

## 2025-04-23 NOTE — TELEPHONE ENCOUNTER
Patient referred to Dr. García by Dr. Reynold Pina for lumbar spondylosis.     MRI Lumbar Spine 6/23/23. Impression:  Lumbar spondylosis and mild scoliosis  Multilevel minimal to mild posterior disc bulge which do not result in significant central spinal or neural foraminal stenosis

## 2025-04-30 NOTE — PROGRESS NOTES
[facility]  History & Physical  Neurosurgery      Mary Aguayo   [id number]   1950       SUBJECTIVE:     CHIEF COMPLAINT:  Lower back and hip pain radiating into the right lower extremity    HPI:  Mary Aguayo is a 75 y.o. female who presents for neurosurgical evaluation at the recommendation of Dr. Reynold Pina. The patient presents today describing ongoing lower back pain radiating into the right lower extremity that began in June of 2024.  The patient experienced a significant fall which brought about these symptoms.  The patient states she has constantly in pain at this time.  She will have burning aching as well as numbness and tingling radiating from the right lower back as well as into the right lower extremity.  She describes intense stabbing and grabbing pain into the right anterior groin and anterior thigh with weight-bearing, movement of the right hip, standing and walking as well as occasionally with sitting.  She rates this pain a 10/10 at this time.  Her  who has accompanied her today states she has had previous consultation with Dr. Vadim Valentine who has recommended a right hip replacement.  She does ambulate with a walker secondary to pain.  She is denying any issues with balance or changes in bowel or bladder function at this time.  Her hip pain can affect her sleep at night.  The patient states her symptoms have persisted despite over-the-counter anti-inflammatories.  She states overall at this time she feels her back pain is manageable although her hip pain is severely limiting.    Past Medical History:   Diagnosis Date    Acquired coagulation factor deficiency     Allergic rhinitis due to pollen     Anxiety disorder, unspecified     Arthritis     Asthma     Atrophy of thyroid (acquired)     Depression     Disc degeneration, lumbar     DVT (deep venous thrombosis)     Factor 5 Leiden mutation, heterozygous     Factor V Leiden mutation     Fibromyalgia      Hyperlipidemia     Hypertension     Hypertensive heart disease     Hypothyroidism, unspecified     Lumbar spondylosis     Migraine     Other specified dorsopathies, [id number] region     Pain in joint of right hip     Sciatica, right side     Vitamin D deficiency        Past Surgical History:   Procedure Laterality Date    BILATERAL TUBAL LIGATION Bilateral     CHOLECYSTECTOMY      COLONOSCOPY      HEMORRHOID SURGERY         Family History   Problem Relation Name Age of Onset    Lymphoma Mother      Cancer Mother      Dementia Father      Hypertension Father         Social History     Socioeconomic History    Marital status:    Tobacco Use    Smoking status: Never    Smokeless tobacco: Never   Substance and Sexual Activity    Alcohol use: Never    Drug use: Never    Sexual activity: Not Currently     Social Drivers of Health     Financial Resource Strain: Low Risk  (12/24/2024)    Overall Financial Resource Strain (CARDIA)     Difficulty of Paying Living Expenses: Not hard at all   Food Insecurity: Patient Declined (12/23/2024)    Hunger Vital Sign     Worried About Running Out of Food in the Last Year: Patient declined     Ran Out of Food in the Last Year: Patient declined   Transportation Needs: No Transportation Needs (12/24/2024)    TRANSPORTATION NEEDS     Transportation : No   Stress: Stress Concern Present (12/24/2024)    [employer] - Occupational Stress Questionnaire     Feeling of Stress : To some extent   Housing Stability: Unknown (12/24/2024)    Housing Stability Vital Sign     Unable to Pay for Housing in the Last Year: No     Homeless in the Last Year: No        Review of patient's allergies indicates:   Allergen Reactions    Pcn [penicillins] Hives    Influenza virus vaccines         Current Outpatient Medications   Medication Instructions    amLODIPine (NORVASC) 5 mg, Oral, Daily    aspirin-acetaminophen-caffeine 250-250-65 mg (EXCEDRIN MIGRAINE)  "250-250-65 mg per tablet 1 tablet, Every 6 hours PRN    DULoxetine (CYMBALTA) 60 MG capsule 2 times daily    levothyroxine (SYNTHROID) 125 mcg    lisinopriL-hydrochlorothiazide (PRINZIDE,ZESTORETIC) 20-25 mg Tab 1 tablet    LORazepam (ATIVAN) 2 mg, 3 times daily    warfarin (COUMADIN) 5 mg, Daily          Review of Systems   Constitutional:  Negative for chills, fever and weight loss.   HENT:  Negative for congestion, hearing loss, nosebleeds and tinnitus.    Eyes:  Negative for blurred vision, double vision and photophobia.   Respiratory:  Negative for cough, shortness of breath and wheezing.    Cardiovascular:  Negative for chest pain, palpitations and leg swelling.   Gastrointestinal:  Negative for constipation, diarrhea, nausea and vomiting.   Genitourinary:  Negative for dysuria, frequency and urgency.   Musculoskeletal:  Positive for back pain and joint pain (Right hip). Negative for falls and neck pain.   Skin:  Negative for itching and rash.   Neurological:  Positive for tingling (burning and numbness into the right lower extremity). Negative for dizziness, tremors, sensory change, speech change, seizures, loss of consciousness and headaches.   Psychiatric/Behavioral:  Negative for depression, hallucinations and memory loss. The patient is not nervous/anxious.        OBJECTIVE:     Visit Vitals  BP (!) 149/86 (BP Location: Right arm, Patient Position: Sitting)   Pulse 85   Resp 16   Ht 5' 4" (1.626 m)   Wt 99.5 kg (219 lb 6.4 oz)   BMI 37.66 kg/m²        Physical Exam    General:  Pleasant, Obese, Well-groomed.    Cardiovascular:  Neck is supple    Lungs:  Breathing is quiet, non-lablored    Abdomen:  Soft, non-tender, non-distended.    Neurological:  Muscle strength against resistance:   Right Left   Deltoid (C5) 5/5 5/5   Biceps (C5/6) 5/5 5/5   Wrist Flexors (C5/6) 5/5 5/5   Triceps (C7) 5/5 5/5   Wrist extension (C7) 5/5 5/5   Finger abduction (C8) 5/5 5/5    5/5 5/5        Hip abduction 5/5 5/5 "   Hip adduction 5/5 5/5   Hip flexion (L2) 5/5 5/5   Knee extension (L3) 5/5 5/5   Knee flexion (L4) 5/5 5/5   Dorsiflexion (L5) 5/5 5/5   EHL (L5) 5/5 5/5   Plantar flexion (S1) 5/5 5/5   Sensation is intact to primary modalities in bilateral upper and lower extremities.  Severe pain with internal and external rotation of the right hip    Reflexes:   Right Left   Triceps (C7) 0 0   Biceps (C5) 0 0   Brachioradialis (C6) 0 0   Patellar (L4) 0 0   Achilles (S1) 0 0   Negative Tinel's, Clonus and Edward bilaterally.  Gait is limping and antalgic with a rolling walker  Coordination is normal.  No tremor noted.    Imaging:  All pertinent neuroimaging independently reviewed. Discussed these findings in detail with the patient.    MRI of the lumbar spine dated 6/23/2023 reveals multilevel disc desiccation with mild levoscoliosis of the thoracolumbar spine.  L2-3, L3-4, and L4-5 with mild bulging, mild facet hypertrophy and ligamentum flavum thickening with no significant canal or foraminal stenosis.  L5-S1 with bilateral facet hypertrophy.      ASSESSMENT:       ICD-10-CM ICD-9-CM   1. Lumbar spondylosis  M47.816 721.3   2. Dorsalgia, unspecified  M54.9 724.5   3. Osteoarthritis of right hip, unspecified osteoarthritis type  M16.11 715.95     PLAN:     1. Lumbar spondylosis (Primary)  - Ambulatory referral/consult to Neurosurgery  - Ambulatory Referral/Consult to Physical Therapy; Future  - MRI Lumbar Spine Without Contrast; Future  - X-Ray Lumbar Spine Ap Lateral w/Flex Ext; Future  - Ambulatory referral/consult to Pain Clinic; Future    2. Dorsalgia, unspecified    3. Osteoarthritis of right hip, unspecified osteoarthritis type    Mary Aguayo presents today describing ongoing lower back pain as well as right hip pain and right lower extremity pain with numbness and burning.  I did take the time to review the patient's previous MRI of the lumbar spine with she, her  and son in clinic today.  I did  explain to the patient that I do feel the majority of her symptoms are likely coming from the degenerative changes in her right hip.  I have recommended that she have further consultation with Dr. Raya regarding a right hip replacement as previously recommended.  We will move forward with an updated MRI and x-rays of the lumbar spine at this time.  I am recommending the patient begin some outpatient physical therapy as well as referring her on for further consultation with conservative management of her lumbar symptoms.  We will have the patient return to clinic in a few months to discuss additional treatment options for the lumbar spine with Dr. Cortez.  She was advised to notify us with any further progression of her symptoms in the lumbar spine prior to that time.  This plan was discussed with the patient, her  and son and they are in agreement to move forward.    Follow up in about 3 months (around 8/12/2025) for PT Follow Up, With Imaging Prior to Appt, with Diego.      E/M Level Based On Time:   15 minutes spent on reviewing chart, which includes interpreting lab results and diagnostic tests.   40 minutes spent in the room with the patient performing a history and physical exam, counseling or educating the patient/caregiver, prescribing medications, ordering labwork/diagnostic tests, or placing referrals.   0 minutes spent collaborating plan of care with physician.   5 minutes spent documenting all relevant clinical informationin the electronic health record.     Total Time Spent: 60 minutes       ADDISON Henriquez  [facility]  Neurosurgery Clinic      Disclaimer:  This note is prepared using voice recognition software and as such is likely to have errors despite attempts at proofreading. Please contact me for questions.

## 2025-05-12 ENCOUNTER — OFFICE VISIT (OUTPATIENT)
Dept: NEUROSURGERY | Facility: CLINIC | Age: 75
End: 2025-05-12
Payer: MEDICARE

## 2025-05-12 VITALS
DIASTOLIC BLOOD PRESSURE: 86 MMHG | WEIGHT: 219.38 LBS | HEIGHT: 64 IN | BODY MASS INDEX: 37.45 KG/M2 | RESPIRATION RATE: 16 BRPM | SYSTOLIC BLOOD PRESSURE: 149 MMHG | HEART RATE: 85 BPM

## 2025-05-12 DIAGNOSIS — M47.816 LUMBAR SPONDYLOSIS: Primary | ICD-10-CM

## 2025-05-12 DIAGNOSIS — M16.11 OSTEOARTHRITIS OF RIGHT HIP, UNSPECIFIED OSTEOARTHRITIS TYPE: ICD-10-CM

## 2025-05-12 DIAGNOSIS — M54.9 DORSALGIA, UNSPECIFIED: ICD-10-CM

## 2025-05-12 PROCEDURE — 99205 OFFICE O/P NEW HI 60 MIN: CPT | Mod: ,,, | Performed by: NURSE PRACTITIONER

## 2025-05-12 PROCEDURE — 1101F PT FALLS ASSESS-DOCD LE1/YR: CPT | Mod: CPTII,,, | Performed by: NURSE PRACTITIONER

## 2025-05-12 PROCEDURE — 3077F SYST BP >= 140 MM HG: CPT | Mod: CPTII,,, | Performed by: NURSE PRACTITIONER

## 2025-05-12 PROCEDURE — 3288F FALL RISK ASSESSMENT DOCD: CPT | Mod: CPTII,,, | Performed by: NURSE PRACTITIONER

## 2025-05-12 PROCEDURE — 3079F DIAST BP 80-89 MM HG: CPT | Mod: CPTII,,, | Performed by: NURSE PRACTITIONER

## 2025-05-12 PROCEDURE — 1160F RVW MEDS BY RX/DR IN RCRD: CPT | Mod: CPTII,,, | Performed by: NURSE PRACTITIONER

## 2025-05-12 PROCEDURE — 1159F MED LIST DOCD IN RCRD: CPT | Mod: CPTII,,, | Performed by: NURSE PRACTITIONER

## 2025-05-12 PROCEDURE — 1125F AMNT PAIN NOTED PAIN PRSNT: CPT | Mod: CPTII,,, | Performed by: NURSE PRACTITIONER

## 2025-05-12 RX ORDER — CEFDINIR 300 MG/1
CAPSULE ORAL
COMMUNITY
End: 2025-05-12

## 2025-05-12 RX ORDER — QUETIAPINE FUMARATE 100 MG/1
1 TABLET, FILM COATED ORAL NIGHTLY
COMMUNITY
End: 2025-05-12

## 2025-05-12 RX ORDER — HYDROCODONE BITARTRATE AND ACETAMINOPHEN 10; 325 MG/1; MG/1
1 TABLET ORAL EVERY 8 HOURS PRN
COMMUNITY
End: 2025-05-12

## 2025-05-12 RX ORDER — TRAMADOL HYDROCHLORIDE 50 MG/1
1 TABLET, FILM COATED ORAL 3 TIMES DAILY PRN
COMMUNITY
End: 2025-05-12

## 2025-05-12 RX ORDER — QUETIAPINE FUMARATE 200 MG/1
1 TABLET, FILM COATED ORAL NIGHTLY
COMMUNITY
End: 2025-05-12

## 2025-05-12 RX ORDER — QUETIAPINE FUMARATE 400 MG/1
400 TABLET, FILM COATED ORAL NIGHTLY
COMMUNITY
End: 2025-05-12

## 2025-05-12 RX ORDER — HYDROCODONE POLISTIREX AND CHLORPHENIRAMINE POLISTIREX 10; 8 MG/5ML; MG/5ML
SUSPENSION, EXTENDED RELEASE ORAL
COMMUNITY
End: 2025-05-12

## 2025-05-12 RX ORDER — BUPROPION HYDROCHLORIDE 300 MG/1
300 TABLET ORAL
COMMUNITY
Start: 2025-02-18 | End: 2025-05-12

## 2025-05-12 RX ORDER — QUETIAPINE FUMARATE 50 MG/1
3 TABLET, FILM COATED ORAL NIGHTLY
COMMUNITY
End: 2025-05-12

## 2025-05-12 RX ORDER — DULOXETIN HYDROCHLORIDE 30 MG/1
CAPSULE, DELAYED RELEASE ORAL
COMMUNITY
End: 2025-05-12

## 2025-05-12 RX ORDER — DULOXETIN HYDROCHLORIDE 60 MG/1
CAPSULE, DELAYED RELEASE ORAL 2 TIMES DAILY
COMMUNITY

## 2025-05-12 RX ORDER — POTASSIUM CHLORIDE 750 MG/1
TABLET, EXTENDED RELEASE ORAL
COMMUNITY
End: 2025-05-12

## 2025-05-12 RX ORDER — CLINDAMYCIN HYDROCHLORIDE 300 MG/1
CAPSULE ORAL
COMMUNITY
End: 2025-05-12

## 2025-05-12 RX ORDER — PROMETHAZINE HYDROCHLORIDE AND DEXTROMETHORPHAN HYDROBROMIDE 6.25; 15 MG/5ML; MG/5ML
SYRUP ORAL
COMMUNITY
End: 2025-05-12

## 2025-05-12 RX ORDER — LEVOTHYROXINE SODIUM 125 UG/1
125 TABLET ORAL
COMMUNITY

## 2025-05-12 RX ORDER — CEPHALEXIN 500 MG/1
1 CAPSULE ORAL 2 TIMES DAILY
COMMUNITY
End: 2025-05-12

## 2025-05-12 RX ORDER — LISINOPRIL AND HYDROCHLOROTHIAZIDE 20; 25 MG/1; MG/1
1 TABLET ORAL
COMMUNITY
Start: 2025-04-15

## 2025-05-12 RX ORDER — DOXYCYCLINE 100 MG/1
1 CAPSULE ORAL 2 TIMES DAILY
COMMUNITY
End: 2025-05-12

## 2025-05-12 RX ORDER — LEVOCETIRIZINE DIHYDROCHLORIDE 5 MG/1
1 TABLET, FILM COATED ORAL EVERY MORNING
COMMUNITY
End: 2025-05-12

## 2025-05-12 RX ORDER — QUETIAPINE FUMARATE 300 MG/1
1 TABLET, FILM COATED ORAL NIGHTLY
COMMUNITY
End: 2025-05-12

## 2025-05-12 RX ORDER — ALBUTEROL SULFATE 0.83 MG/ML
SOLUTION RESPIRATORY (INHALATION)
COMMUNITY
End: 2025-05-12

## 2025-05-12 RX ORDER — IPRATROPIUM BROMIDE 42 UG/1
2 SPRAY, METERED NASAL 3 TIMES DAILY
COMMUNITY
End: 2025-05-12

## 2025-05-12 RX ORDER — CEFPODOXIME PROXETIL 200 MG/1
1 TABLET, FILM COATED ORAL 2 TIMES DAILY
COMMUNITY
End: 2025-05-12

## 2025-05-12 RX ORDER — TRIAMCINOLONE ACETONIDE 5 MG/G
CREAM TOPICAL
COMMUNITY
End: 2025-05-12

## 2025-05-16 ENCOUNTER — HOSPITAL ENCOUNTER (OUTPATIENT)
Dept: RADIOLOGY | Facility: HOSPITAL | Age: 75
Discharge: HOME | End: 2025-05-16
Attending: NURSE PRACTITIONER
Payer: MEDICARE

## 2025-05-16 DIAGNOSIS — M47.816 LUMBAR SPONDYLOSIS: ICD-10-CM

## 2025-05-16 PROCEDURE — 72114 X-RAY EXAM L-S SPINE BENDING: CPT | Mod: TC

## 2025-05-16 PROCEDURE — 72148 MRI LUMBAR SPINE W/O DYE: CPT | Mod: TC

## 2025-05-19 ENCOUNTER — TELEPHONE (OUTPATIENT)
Dept: NEUROSURGERY | Facility: CLINIC | Age: 75
End: 2025-05-19
Payer: MEDICARE

## 2025-07-28 DIAGNOSIS — Z12.31 OTHER SCREENING MAMMOGRAM: Primary | ICD-10-CM

## 2025-07-28 DIAGNOSIS — Z78.0 POSTMENOPAUSAL: ICD-10-CM

## 2025-08-15 ENCOUNTER — HOSPITAL ENCOUNTER (OUTPATIENT)
Dept: RADIOLOGY | Facility: HOSPITAL | Age: 75
Discharge: HOME | End: 2025-08-15
Attending: FAMILY MEDICINE
Payer: MEDICARE

## 2025-08-15 DIAGNOSIS — Z12.31 OTHER SCREENING MAMMOGRAM: ICD-10-CM

## 2025-08-15 DIAGNOSIS — Z78.0 POSTMENOPAUSAL: ICD-10-CM

## 2025-08-15 PROCEDURE — 77080 DXA BONE DENSITY AXIAL: CPT | Mod: TC

## 2025-08-15 PROCEDURE — 77067 SCR MAMMO BI INCL CAD: CPT | Mod: 26,,, | Performed by: STUDENT IN AN ORGANIZED HEALTH CARE EDUCATION/TRAINING PROGRAM

## 2025-08-15 PROCEDURE — 77063 BREAST TOMOSYNTHESIS BI: CPT | Mod: 26,,, | Performed by: STUDENT IN AN ORGANIZED HEALTH CARE EDUCATION/TRAINING PROGRAM

## 2025-08-15 PROCEDURE — 77063 BREAST TOMOSYNTHESIS BI: CPT | Mod: TC
